# Patient Record
Sex: FEMALE | Race: WHITE | NOT HISPANIC OR LATINO | Employment: UNEMPLOYED | URBAN - METROPOLITAN AREA
[De-identification: names, ages, dates, MRNs, and addresses within clinical notes are randomized per-mention and may not be internally consistent; named-entity substitution may affect disease eponyms.]

---
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
## 2024-05-18 ENCOUNTER — HOSPITAL ENCOUNTER (EMERGENCY)
Facility: HOSPITAL | Age: 19
Discharge: HOME | End: 2024-05-18
Attending: STUDENT IN AN ORGANIZED HEALTH CARE EDUCATION/TRAINING PROGRAM
Payer: MEDICAID

## 2024-05-18 ENCOUNTER — APPOINTMENT (OUTPATIENT)
Dept: ULTRASOUND IMAGING | Facility: HOSPITAL | Age: 19
End: 2024-05-18
Payer: MEDICAID

## 2024-05-18 VITALS
DIASTOLIC BLOOD PRESSURE: 74 MMHG | RESPIRATION RATE: 16 BRPM | TEMPERATURE: 97.3 F | OXYGEN SATURATION: 97 % | SYSTOLIC BLOOD PRESSURE: 101 MMHG | HEIGHT: 63 IN | WEIGHT: 130 LBS | BODY MASS INDEX: 23.04 KG/M2 | HEART RATE: 70 BPM

## 2024-05-18 DIAGNOSIS — N93.8 DYSFUNCTIONAL UTERINE BLEEDING: Primary | ICD-10-CM

## 2024-05-18 LAB
ABO GROUP BLD: NORMAL
ALBUMIN SERPL-MCNC: 4.7 G/DL (ref 3.5–5.2)
ALBUMIN/GLOB SERPL: 1.7 G/DL
ALP SERPL-CCNC: 81 U/L (ref 39–117)
ALT SERPL W P-5'-P-CCNC: 12 U/L (ref 1–33)
ANION GAP SERPL CALCULATED.3IONS-SCNC: 11.3 MMOL/L (ref 5–15)
AST SERPL-CCNC: 14 U/L (ref 1–32)
BACTERIA UR QL AUTO: ABNORMAL /HPF
BASOPHILS # BLD AUTO: 0.1 10*3/MM3 (ref 0–0.2)
BASOPHILS NFR BLD AUTO: 0.8 % (ref 0–1.5)
BILIRUB SERPL-MCNC: 0.2 MG/DL (ref 0–1.2)
BILIRUB UR QL STRIP: NEGATIVE
BUN SERPL-MCNC: 6 MG/DL (ref 6–20)
BUN/CREAT SERPL: 9.2 (ref 7–25)
CALCIUM SPEC-SCNC: 9.3 MG/DL (ref 8.6–10.5)
CHLORIDE SERPL-SCNC: 104 MMOL/L (ref 98–107)
CLARITY UR: ABNORMAL
CO2 SERPL-SCNC: 21.7 MMOL/L (ref 22–29)
COLOR UR: YELLOW
CREAT SERPL-MCNC: 0.65 MG/DL (ref 0.57–1)
DEPRECATED RDW RBC AUTO: 44.3 FL (ref 37–54)
EGFRCR SERPLBLD CKD-EPI 2021: 129.4 ML/MIN/1.73
EOSINOPHIL # BLD AUTO: 0.69 10*3/MM3 (ref 0–0.4)
EOSINOPHIL NFR BLD AUTO: 5.3 % (ref 0.3–6.2)
ERYTHROCYTE [DISTWIDTH] IN BLOOD BY AUTOMATED COUNT: 13.8 % (ref 12.3–15.4)
GLOBULIN UR ELPH-MCNC: 2.8 GM/DL
GLUCOSE SERPL-MCNC: 95 MG/DL (ref 65–99)
GLUCOSE UR STRIP-MCNC: NEGATIVE MG/DL
HCG INTACT+B SERPL-ACNC: <1 MIU/ML
HCT VFR BLD AUTO: 44.3 % (ref 34–46.6)
HGB BLD-MCNC: 14.2 G/DL (ref 12–15.9)
HGB UR QL STRIP.AUTO: ABNORMAL
HYALINE CASTS UR QL AUTO: ABNORMAL /LPF
IMM GRANULOCYTES # BLD AUTO: 0.04 10*3/MM3 (ref 0–0.05)
IMM GRANULOCYTES NFR BLD AUTO: 0.3 % (ref 0–0.5)
KETONES UR QL STRIP: NEGATIVE
LEUKOCYTE ESTERASE UR QL STRIP.AUTO: ABNORMAL
LYMPHOCYTES # BLD AUTO: 2.46 10*3/MM3 (ref 0.7–3.1)
LYMPHOCYTES NFR BLD AUTO: 19.1 % (ref 19.6–45.3)
MCH RBC QN AUTO: 28 PG (ref 26.6–33)
MCHC RBC AUTO-ENTMCNC: 32.1 G/DL (ref 31.5–35.7)
MCV RBC AUTO: 87.2 FL (ref 79–97)
MONOCYTES # BLD AUTO: 0.83 10*3/MM3 (ref 0.1–0.9)
MONOCYTES NFR BLD AUTO: 6.4 % (ref 5–12)
NEUTROPHILS NFR BLD AUTO: 68.1 % (ref 42.7–76)
NEUTROPHILS NFR BLD AUTO: 8.79 10*3/MM3 (ref 1.7–7)
NITRITE UR QL STRIP: NEGATIVE
NRBC BLD AUTO-RTO: 0 /100 WBC (ref 0–0.2)
PH UR STRIP.AUTO: 5.5 [PH] (ref 5–8)
PLATELET # BLD AUTO: 488 10*3/MM3 (ref 140–450)
PMV BLD AUTO: 9.5 FL (ref 6–12)
POTASSIUM SERPL-SCNC: 3.8 MMOL/L (ref 3.5–5.2)
PROT SERPL-MCNC: 7.5 G/DL (ref 6–8.5)
PROT UR QL STRIP: NEGATIVE
RBC # BLD AUTO: 5.08 10*6/MM3 (ref 3.77–5.28)
RBC # UR STRIP: ABNORMAL /HPF
REF LAB TEST METHOD: ABNORMAL
RH BLD: POSITIVE
SODIUM SERPL-SCNC: 137 MMOL/L (ref 136–145)
SP GR UR STRIP: 1.02 (ref 1–1.03)
SQUAMOUS #/AREA URNS HPF: ABNORMAL /HPF
UROBILINOGEN UR QL STRIP: ABNORMAL
WBC # UR STRIP: ABNORMAL /HPF
WBC NRBC COR # BLD AUTO: 12.91 10*3/MM3 (ref 3.4–10.8)

## 2024-05-18 PROCEDURE — 86901 BLOOD TYPING SEROLOGIC RH(D): CPT | Performed by: PHYSICIAN ASSISTANT

## 2024-05-18 PROCEDURE — 85025 COMPLETE CBC W/AUTO DIFF WBC: CPT | Performed by: PHYSICIAN ASSISTANT

## 2024-05-18 PROCEDURE — 93976 VASCULAR STUDY: CPT

## 2024-05-18 PROCEDURE — 81001 URINALYSIS AUTO W/SCOPE: CPT | Performed by: PHYSICIAN ASSISTANT

## 2024-05-18 PROCEDURE — 76830 TRANSVAGINAL US NON-OB: CPT

## 2024-05-18 PROCEDURE — 36415 COLL VENOUS BLD VENIPUNCTURE: CPT

## 2024-05-18 PROCEDURE — 76856 US EXAM PELVIC COMPLETE: CPT

## 2024-05-18 PROCEDURE — 99284 EMERGENCY DEPT VISIT MOD MDM: CPT

## 2024-05-18 PROCEDURE — 86900 BLOOD TYPING SEROLOGIC ABO: CPT | Performed by: PHYSICIAN ASSISTANT

## 2024-05-18 PROCEDURE — 80053 COMPREHEN METABOLIC PANEL: CPT | Performed by: PHYSICIAN ASSISTANT

## 2024-05-18 PROCEDURE — 84702 CHORIONIC GONADOTROPIN TEST: CPT | Performed by: PHYSICIAN ASSISTANT

## 2024-05-19 NOTE — ED PROVIDER NOTES
EMERGENCY DEPARTMENT MD ATTESTATION NOTE    Room Number:  33/33  PCP: Provider, No Known      HPI:    Context: Danika Owusu is a 19 y.o. female with a medical history of ovarian cysts who presents to the ED c/o acute vaginal bleeding.  Patient thinks that she is 7 weeks pregnant after a positive test at home.  Patient has not had ultrasound confirming IUP.        Review of prior external notes (non-ED) -and- Review of prior external test results outside of this encounter: Discharge summary from 10/23/2023 reviewed and notable for admission secondary to active later.  Patient had uncomplicated delivery and eventually discharged home.    Prescription drug monitoring program review:           PHYSICAL EXAM    I have reviewed the triage vital signs and nursing notes.    ED Triage Vitals   Temp Heart Rate Resp BP SpO2   05/18/24 1843 05/18/24 1843 05/18/24 1843 05/18/24 1924 05/18/24 1843   97.3 °F (36.3 °C) 113 18 122/77 98 %      Temp src Heart Rate Source Patient Position BP Location FiO2 (%)   05/18/24 1843 05/18/24 1843 -- -- --   Tympanic Monitor              MEDICATIONS GIVEN IN ER  Medications - No data to display      ORDERS PLACED DURING THIS VISIT:  Orders Placed This Encounter   Procedures    US Testicular or Ovarian Vascular Limited    US Pelvis Transvaginal Non OB    US Pelvis Complete    Comprehensive Metabolic Panel    hCG, Quantitative, Pregnancy    Urinalysis With Microscopic If Indicated (No Culture) - Urine, Catheter    CBC Auto Differential    Urinalysis, Microscopic Only - Urine, Clean Catch    ABO / Rh    CBC & Differential         PROCEDURES  Procedures            PROGRESS, DATA ANALYSIS, CONSULTS, AND MEDICAL DECISION MAKING  All labs have been independently interpreted by me.  All radiology studies have been reviewed by me. All EKG's have been independently viewed and interpreted by me.  Discussion below represents my analysis of pertinent findings related to patient's condition, differential  diagnosis, treatment plan and final disposition.    Differential diagnosis includes but is not limited to threatened , first trimester bleeding, menstrual cycle    Clinical Scores:                   ED Course as of 24 2110   Sat May 18, 2024   1923 FIRST LOOK:  M1  LMP 2024  Positive urine pregnancy test at home.  OB/GYN: Dr. Gerhard Cagle    Woke this morning at ~0600 and noted pink discharge. At ~1700 bleeding increased and started having lower abdominal cramping. She has not had to change her pad yet. Had a few minutes of feeling light-headed which has since improved. [AR]    Hemoglobin: 14.2 [EE]    HCG Quantitative: <1.00 [EE]    Creatinine: 0.65 [EE]    I discussed ultrasound findings with Cynthia technician.  Patient has no visible IUP.  There is a small ovarian cyst.  Good blood flow to both ovaries. [EE]    Updated patient on workup.  We will refer her to GYN.  Patient has stable vitals. [EE]      ED Course User Index  [AR] Morelia Clifford PA  [EE] Jose Sutton PA       MDM: 19-year-old female presenting for evaluation of vaginal bleeding during pregnancy.  Patient has a quantitative hCG less than 1.  Patient has no visible IUP on ultrasound.  Do not believe patient is pregnant.  Patient discharged to follow-up on outpatient basis.      COMPLEXITY OF CARE  Admission was considered but after careful review of the patient's presentation, physical examination, diagnostic results, and response to treatment the patient may be safely discharged with outpatient follow-up.    Please note that portions of this document were completed with a voice recognition program.    Note Disclaimer: At [facility], we believe that sharing information builds trust and better relationships. You are receiving this note because you recently visited [facility]. It is possible you will see health information before a provider has talked with you about it. This kind of  information can be easy to misunderstand. To help you fully understand what it means for your health, we urge you to discuss this note with your provider.         Jim Franco MD  05/18/24 0836

## 2024-05-19 NOTE — ED PROVIDER NOTES
EMERGENCY DEPARTMENT ENCOUNTER    Room Number:  33/33  Date of encounter:  2024  PCP: Provider, No Known  Historian: Patient  Chronic or social conditions impacting care (social determinants of health): None    HPI:  Chief Complaint: Vaginal bleeding  A complete HPI/ROS/PMH/PSH/SH/FH are unobtainable due to: Nothing    Context: Danika Owusu is a 19 y.o. female with a history of ovarian cysts.  She presents to the ED c/o acute vaginal bleeding at 7 weeks pregnancy.  Patient is .  Patient follows with Dr. Cagle in obstetrics.  Patient awoke this morning with some mild pinkish discharge.  At 5:00 this evening bleeding and cramping worsened.  She denies any chest pain.  She did report some mild lightheadedness however this has resolved.    Review of prior external notes (non-ED):   I reviewed admission from 10/21/2023.  Patient had vaginal delivery.    Review of prior external test results outside of this encounter:  Reviewed blood work from 10/21/2023.  Patient with O+ blood.    PAST MEDICAL HISTORY  Active Ambulatory Problems     Diagnosis Date Noted    Fetal cardiac anomaly affecting pregnancy, antepartum 2023    Advanced care planning/counseling discussion 2023    False labor after 37 weeks of gestation without delivery 10/13/2023     (spontaneous vaginal delivery) 10/21/2023     Resolved Ambulatory Problems     Diagnosis Date Noted    Pregnancy 10/21/2023     Past Medical History:   Diagnosis Date    Ovarian cyst          PAST SURGICAL HISTORY  No past surgical history on file.      FAMILY HISTORY  Family History   Problem Relation Age of Onset    Hypertension Maternal Grandmother     Hypertension Maternal Grandfather     Hypertension Paternal Grandmother     Hypertension Paternal Grandfather     Birth defects Neg Hx     Diabetes Neg Hx          SOCIAL HISTORY  Social History     Socioeconomic History    Marital status: Single     Spouse name: Vincenzo Dan    Number of children: 0     Years of education: 12    Highest education level: High school graduate   Tobacco Use    Smoking status: Never     Passive exposure: Current    Smokeless tobacco: Never   Vaping Use    Vaping status: Every Day    Substances: Nicotine, Flavoring    Devices: Disposable    Passive vaping exposure: Yes   Substance and Sexual Activity    Alcohol use: Never    Drug use: Not Currently     Frequency: 4.0 times per week     Types: Marijuana    Sexual activity: Yes     Partners: Male     Birth control/protection: None         ALLERGIES  Shellfish allergy and Fd&c red #40 [red dye]        REVIEW OF SYSTEMS  All systems reviewed and negative except for those discussed in HPI.       PHYSICAL EXAM    I have reviewed the triage vital signs and nursing notes.    ED Triage Vitals   Temp Heart Rate Resp BP SpO2   05/18/24 1843 05/18/24 1843 05/18/24 1843 05/18/24 1924 05/18/24 1843   97.3 °F (36.3 °C) 113 18 122/77 98 %      Temp src Heart Rate Source Patient Position BP Location FiO2 (%)   05/18/24 1843 05/18/24 1843 -- -- --   Tympanic Monitor          Physical Exam  GENERAL: Alert, oriented, nontoxic-appearing, not distressed  HENT: head atraumatic, no nuchal rigidity  EYES: no scleral icterus, EOMI  CV: regular rhythm, regular rate, no murmur  RESPIRATORY: normal effort, CTA  ABDOMEN: soft, nontender  MUSCULOSKELETAL: no deformity, FROM, no calf swelling or tenderness  NEURO: alert, moves all extremities, follows commands  SKIN: warm, dry        LAB RESULTS  Recent Results (from the past 24 hour(s))   Comprehensive Metabolic Panel    Collection Time: 05/18/24  7:36 PM    Specimen: Blood   Result Value Ref Range    Glucose 95 65 - 99 mg/dL    BUN 6 6 - 20 mg/dL    Creatinine 0.65 0.57 - 1.00 mg/dL    Sodium 137 136 - 145 mmol/L    Potassium 3.8 3.5 - 5.2 mmol/L    Chloride 104 98 - 107 mmol/L    CO2 21.7 (L) 22.0 - 29.0 mmol/L    Calcium 9.3 8.6 - 10.5 mg/dL    Total Protein 7.5 6.0 - 8.5 g/dL    Albumin 4.7 3.5 - 5.2 g/dL    ALT  (SGPT) 12 1 - 33 U/L    AST (SGOT) 14 1 - 32 U/L    Alkaline Phosphatase 81 39 - 117 U/L    Total Bilirubin 0.2 0.0 - 1.2 mg/dL    Globulin 2.8 gm/dL    A/G Ratio 1.7 g/dL    BUN/Creatinine Ratio 9.2 7.0 - 25.0    Anion Gap 11.3 5.0 - 15.0 mmol/L    eGFR 129.4 >60.0 mL/min/1.73   ABO / Rh    Collection Time: 05/18/24  7:36 PM    Specimen: Blood   Result Value Ref Range    ABO Type O     RH type Positive    hCG, Quantitative, Pregnancy    Collection Time: 05/18/24  7:36 PM    Specimen: Blood   Result Value Ref Range    HCG Quantitative <1.00 mIU/mL   Urinalysis With Microscopic If Indicated (No Culture) - Urine, Clean Catch    Collection Time: 05/18/24  7:36 PM    Specimen: Urine, Clean Catch   Result Value Ref Range    Color, UA Yellow Yellow, Straw    Appearance, UA Cloudy (A) Clear    pH, UA 5.5 5.0 - 8.0    Specific Gravity, UA 1.018 1.005 - 1.030    Glucose, UA Negative Negative    Ketones, UA Negative Negative    Bilirubin, UA Negative Negative    Blood, UA Large (3+) (A) Negative    Protein, UA Negative Negative    Leuk Esterase, UA Trace (A) Negative    Nitrite, UA Negative Negative    Urobilinogen, UA 0.2 E.U./dL 0.2 - 1.0 E.U./dL   CBC Auto Differential    Collection Time: 05/18/24  7:36 PM    Specimen: Blood   Result Value Ref Range    WBC 12.91 (H) 3.40 - 10.80 10*3/mm3    RBC 5.08 3.77 - 5.28 10*6/mm3    Hemoglobin 14.2 12.0 - 15.9 g/dL    Hematocrit 44.3 34.0 - 46.6 %    MCV 87.2 79.0 - 97.0 fL    MCH 28.0 26.6 - 33.0 pg    MCHC 32.1 31.5 - 35.7 g/dL    RDW 13.8 12.3 - 15.4 %    RDW-SD 44.3 37.0 - 54.0 fl    MPV 9.5 6.0 - 12.0 fL    Platelets 488 (H) 140 - 450 10*3/mm3    Neutrophil % 68.1 42.7 - 76.0 %    Lymphocyte % 19.1 (L) 19.6 - 45.3 %    Monocyte % 6.4 5.0 - 12.0 %    Eosinophil % 5.3 0.3 - 6.2 %    Basophil % 0.8 0.0 - 1.5 %    Immature Grans % 0.3 0.0 - 0.5 %    Neutrophils, Absolute 8.79 (H) 1.70 - 7.00 10*3/mm3    Lymphocytes, Absolute 2.46 0.70 - 3.10 10*3/mm3    Monocytes, Absolute 0.83  0.10 - 0.90 10*3/mm3    Eosinophils, Absolute 0.69 (H) 0.00 - 0.40 10*3/mm3    Basophils, Absolute 0.10 0.00 - 0.20 10*3/mm3    Immature Grans, Absolute 0.04 0.00 - 0.05 10*3/mm3    nRBC 0.0 0.0 - 0.2 /100 WBC   Urinalysis, Microscopic Only - Urine, Clean Catch    Collection Time: 05/18/24  7:36 PM    Specimen: Urine, Clean Catch   Result Value Ref Range    RBC, UA 0-2 None Seen, 0-2 /HPF    WBC, UA 6-10 (A) None Seen, 0-2 /HPF    Bacteria, UA Trace (A) None Seen /HPF    Squamous Epithelial Cells, UA 7-12 (A) None Seen, 0-2 /HPF    Hyaline Casts, UA 0-2 None Seen /LPF    Methodology Automated Microscopy        Ordered the above labs and independently reviewed the results.        RADIOLOGY  US Testicular or Ovarian Vascular Limited, US Pelvis Transvaginal Non OB, US Pelvis Complete    Result Date: 5/18/2024  US PELVIS COMPLETE-, US TESTICULAR OR OVARIAN VASCULAR LIMITED-, US PELVIC AND TRANSVAGINAL NONOBSTETRICAL-  Clinical: 19-year-old female with vaginal bleeding, home pregnancy test positive, quantitative beta-hCG in ER is negative  Transabdominal and transvaginal ultrasound  Grayscale imaging and color Doppler  FINDINGS: The uterus measures 8.1 cm in length, 3.7 cm AP and 4.6 cm transverse.  The right ovary measures 2.7 x 2.7 x 2.0 cm. It is normal. Typical follicles demonstrated along its surface. Arterial inflow and venous outflow demonstrated on color Doppler and spectral analysis.  The left ovary measures 4.5 x 3.9 x 4.8 cm. There is a left ovarian cyst with several fine internal septations, it measures 3.5 x 3.4 x 3.5 cm. Arterial inflow and venous outflow demonstrated on color Doppler and spectral analysis.  No free fluid is demonstrated within the pelvis. No IUP is demonstrated. The endometrial thickness is 8 mm. Remainder is unremarkable.  CONCLUSION: No IUP. Complex left ovarian cyst as described above.  This report was finalized on 5/18/2024 9:15 PM by Dr. Jay Loja M.D on Workstation: [id number]        I ordered the above noted radiological studies. Reviewed by me and discussed with radiologist.  See dictation for official radiology interpretation.      MEDICATIONS GIVEN IN ER    Medications - No data to display      ADDITIONAL ORDERS CONSIDERED BUT NOT ORDERED:  Nothing      PROGRESS, DATA ANALYSIS, CONSULTS, AND MEDICAL DECISION MAKING    All labs have been independently interpreted by myself.  All radiology studies have been independently interpreted by myself and discussed with radiologist dictating the report.   EKG's independently interpreted by myself.  Discussion below represents my analysis of pertinent findings related to patient's condition, differential diagnosis, treatment plan and final disposition.    I have discussed case with Dr. Franco, emergency room physician.  He has performed his own bedside examination and agrees with treatment plan.    ED Course as of 24 0502   Sat May 18, 2024   1923 FIRST LOOK:  M1  LMP 2024  Positive urine pregnancy test at home.  OB/GYN: Dr. Gerhard Cagle    Woke this morning at ~0600 and noted pink discharge. At ~1700 bleeding increased and started having lower abdominal cramping. She has not had to change her pad yet. Had a few minutes of feeling light-headed which has since improved. [AR]    Hemoglobin: 14.2 [EE]    HCG Quantitative: <1.00 [EE]    Creatinine: 0.65 [EE]    I discussed ultrasound findings with Cynthia, technician.  Patient has no visible IUP.  There is a small ovarian cyst.  Good blood flow to both ovaries. [EE]    Updated patient on workup.  We will refer her to GYN.  Patient has stable vitals. [EE]      ED Course User Index  [AR] Morelia Clifford PA  [EE] Jose Sutton PA       AS OF 05:02 EDT VITALS:    BP - 101/74  HR - 70  TEMP - 97.3 °F (36.3 °C) (Tympanic)  O2 SATS - 97%        DIAGNOSIS  Final diagnoses:   Dysfunctional uterine bleeding         DISPOSITION  Discharged    Admission was considered  but after careful review of the patient's presentation, physical examination, diagnostic results, and response to treatment the patient may be safely discharged with outpatient follow-up.         Dictated utilizing Dragon dictation     Jose Sutton PA  05/19/24 050

## 2024-06-05 ENCOUNTER — TELEPHONE (OUTPATIENT)
Dept: OBSTETRICS AND GYNECOLOGY | Facility: CLINIC | Age: 19
End: 2024-06-05
Payer: MEDICAID

## 2024-06-05 NOTE — TELEPHONE ENCOUNTER
Dr. Cagle pt    Believes she is pregnant. Took test around the beginning of may (positive), says she went to ED 5/18/24 and was told she is not pregnant, and HCG was <1.00    Pt says she is pregnant and ED may have been wrong. Should she schedule first available new ob or labs?    Please advise,   Thank you

## 2024-09-17 ENCOUNTER — INITIAL PRENATAL (OUTPATIENT)
Dept: OBSTETRICS AND GYNECOLOGY | Facility: CLINIC | Age: 19
End: 2024-09-17
Payer: MEDICAID

## 2024-09-17 VITALS — WEIGHT: 133.2 LBS | DIASTOLIC BLOOD PRESSURE: 66 MMHG | SYSTOLIC BLOOD PRESSURE: 114 MMHG | BODY MASS INDEX: 23.6 KG/M2

## 2024-09-17 DIAGNOSIS — Z34.91 INITIAL OBSTETRIC VISIT IN FIRST TRIMESTER: ICD-10-CM

## 2024-09-17 DIAGNOSIS — Z3A.08 8 WEEKS GESTATION OF PREGNANCY: Primary | ICD-10-CM

## 2024-09-17 LAB
B-HCG UR QL: POSITIVE
EXPIRATION DATE: ABNORMAL
INTERNAL NEGATIVE CONTROL: NEGATIVE
INTERNAL POSITIVE CONTROL: ABNORMAL
Lab: ABNORMAL

## 2024-09-19 LAB
BACTERIA UR CULT: NORMAL
BACTERIA UR CULT: NORMAL
C TRACH RRNA SPEC QL NAA+PROBE: NEGATIVE
N GONORRHOEA RRNA SPEC QL NAA+PROBE: NEGATIVE

## 2024-09-20 LAB
AMPHETAMINES UR QL SCN: NEGATIVE NG/ML
BARBITURATES UR QL SCN: NEGATIVE NG/ML
BENZODIAZ UR QL: NEGATIVE NG/ML
BZE UR QL: NEGATIVE NG/ML
CARBOXYTHC UR QL CFM: POSITIVE
METHADONE UR QL SCN: NEGATIVE NG/ML
OPIATES UR QL: NEGATIVE NG/ML
PCP UR QL SCN: NEGATIVE NG/ML
PROPOXYPH UR QL SCN: NEGATIVE NG/ML

## 2024-10-15 ENCOUNTER — ROUTINE PRENATAL (OUTPATIENT)
Dept: OBSTETRICS AND GYNECOLOGY | Facility: CLINIC | Age: 19
End: 2024-10-15
Payer: MEDICAID

## 2024-10-15 VITALS — DIASTOLIC BLOOD PRESSURE: 78 MMHG | SYSTOLIC BLOOD PRESSURE: 118 MMHG | BODY MASS INDEX: 23.77 KG/M2 | WEIGHT: 134.2 LBS

## 2024-10-15 DIAGNOSIS — Z34.81 PRENATAL CARE, SUBSEQUENT PREGNANCY IN FIRST TRIMESTER: ICD-10-CM

## 2024-10-15 DIAGNOSIS — Z3A.11 11 WEEKS GESTATION OF PREGNANCY: Primary | ICD-10-CM

## 2024-10-15 LAB
GLUCOSE UR STRIP-MCNC: NEGATIVE MG/DL
PROT UR STRIP-MCNC: NEGATIVE MG/DL

## 2024-10-15 NOTE — PROGRESS NOTES
OB follow up     Danika Owusu is a 19 y.o.  11w5d being seen today for her obstetrical visit.  Patient reports no complaints. Fetal movement:  Too early .    Her prenatal care is complicated by (and status): Prior child with VSD    Review of Systems  Cramping/contractions : Mild cramps with no bleeding  Vaginal bleeding: Negative  Fetal movement too early    /78   Wt 60.9 kg (134 lb 3.2 oz)   LMP 2024 (Exact Date)   BMI 23.77 kg/m²     FHT: 146 BPM   Uterine Size: size equals dates       Assessment    Diagnoses and all orders for this visit:    1. 11 weeks gestation of pregnancy (Primary)  -     POC Urinalysis Dipstick  -     OB Panel With HIV  -     [id number] PLUS Core+SCA - Blood,    2. Prenatal care, subsequent pregnancy in first trimester        1) pregnancy at 11w5d         Plan    Reviewed this stage of pregnancy  Problem list updated   Follow up in 4 weeks.  NIPT and prenatal labs today.  Will make MFM referral on her next visit.    Gerhard Cagle MD   10/15/2024  10:47 EDT

## 2024-10-16 LAB
ABO GROUP BLD: ABNORMAL
BASOPHILS # BLD AUTO: 0.1 X10E3/UL (ref 0–0.2)
BASOPHILS NFR BLD AUTO: 1 %
BLD GP AB SCN SERPL QL: NEGATIVE
EOSINOPHIL # BLD AUTO: 0.6 X10E3/UL (ref 0–0.4)
EOSINOPHIL NFR BLD AUTO: 4 %
ERYTHROCYTE [DISTWIDTH] IN BLOOD BY AUTOMATED COUNT: 12.7 % (ref 11.7–15.4)
HBV SURFACE AG SERPL QL IA: NEGATIVE
HCT VFR BLD AUTO: 42 % (ref 34–46.6)
HCV AB SERPL QL IA: NORMAL
HCV IGG SERPL QL IA: NON REACTIVE
HGB BLD-MCNC: 13.8 G/DL (ref 11.1–15.9)
HIV 1+2 AB+HIV1 P24 AG SERPL QL IA: NON REACTIVE
IMM GRANULOCYTES # BLD AUTO: 0.1 X10E3/UL (ref 0–0.1)
IMM GRANULOCYTES NFR BLD AUTO: 1 %
LYMPHOCYTES # BLD AUTO: 3.6 X10E3/UL (ref 0.7–3.1)
LYMPHOCYTES NFR BLD AUTO: 24 %
MCH RBC QN AUTO: 28.9 PG (ref 26.6–33)
MCHC RBC AUTO-ENTMCNC: 32.9 G/DL (ref 31.5–35.7)
MCV RBC AUTO: 88 FL (ref 79–97)
MONOCYTES # BLD AUTO: 0.9 X10E3/UL (ref 0.1–0.9)
MONOCYTES NFR BLD AUTO: 6 %
NEUTROPHILS # BLD AUTO: 9.6 X10E3/UL (ref 1.4–7)
NEUTROPHILS NFR BLD AUTO: 64 %
PLATELET # BLD AUTO: 488 X10E3/UL (ref 150–450)
RBC # BLD AUTO: 4.77 X10E6/UL (ref 3.77–5.28)
RH BLD: POSITIVE
RPR SER QL: NON REACTIVE
RUBV IGG SERPL IA-ACNC: 1.59 INDEX
WBC # BLD AUTO: 14.9 X10E3/UL (ref 3.4–10.8)

## 2024-10-22 LAB
CFDNA.FET/CFDNA.TOTAL SFR FETUS: NORMAL %
CITATION REF LAB TEST: NORMAL
FET 13+18+21+X+Y ANEUP PLAS.CFDNA: NEGATIVE
FET CHR 21 TS PLAS.CFDNA QL: NEGATIVE
FET MS X RISK WBC.DNA+CFDNA QL: NOT DETECTED
FET SEX PLAS.CFDNA DOSAGE CFDNA: NORMAL
FET TS 13 RISK PLAS.CFDNA QL: NEGATIVE
FET TS 18 RISK WBC.DNA+CFDNA QL: NEGATIVE
FET X + Y ANEUP RISK PLAS.CFDNA SEQ-IMP: NOT DETECTED
GA EST FROM CONCEPTION DATE: NORMAL D
GESTATIONAL AGE > 9:: YES
LAB DIRECTOR NAME PROVIDER: NORMAL
LAB DIRECTOR NAME PROVIDER: NORMAL
LABORATORY COMMENT REPORT: NORMAL
LIMITATIONS OF THE TEST: NORMAL
NEGATIVE PREDICTIVE VALUE: NORMAL
NOTE: NORMAL
PERFORMANCE CHARACTERISTICS: NORMAL
POSITIVE PREDICTIVE VALUE: NORMAL
REF LAB TEST METHOD: NORMAL
TEST PERFORMANCE INFO SPEC: NORMAL

## 2024-11-13 ENCOUNTER — ROUTINE PRENATAL (OUTPATIENT)
Dept: OBSTETRICS AND GYNECOLOGY | Facility: CLINIC | Age: 19
End: 2024-11-13
Payer: MEDICAID

## 2024-11-13 VITALS — SYSTOLIC BLOOD PRESSURE: 148 MMHG | DIASTOLIC BLOOD PRESSURE: 83 MMHG | WEIGHT: 137.4 LBS | BODY MASS INDEX: 24.34 KG/M2

## 2024-11-13 DIAGNOSIS — O35.BXX0 ANOMALY OF HEART OF FETUS AFFECTING PREGNANCY, ANTEPARTUM, SINGLE OR UNSPECIFIED FETUS: ICD-10-CM

## 2024-11-13 DIAGNOSIS — Z3A.15 15 WEEKS GESTATION OF PREGNANCY: Primary | ICD-10-CM

## 2024-11-13 DIAGNOSIS — Z34.82 PRENATAL CARE, SUBSEQUENT PREGNANCY IN SECOND TRIMESTER: ICD-10-CM

## 2024-11-13 LAB
GLUCOSE UR STRIP-MCNC: NEGATIVE MG/DL
PROT UR STRIP-MCNC: ABNORMAL MG/DL

## 2024-11-13 NOTE — PROGRESS NOTES
OB follow up     Danika Owusu is a 19 y.o.  15w6d being seen today for her obstetrical visit.  Patient reports no complaints. Fetal movement: normal.    Her prenatal care is complicated by (and status): Prior child with congenital heart defect    Review of Systems  Cramping/contractions : Negative  Vaginal bleeding: Negative  Fetal movement is normal    /83   Wt 62.3 kg (137 lb 6.4 oz)   LMP 2024 (Exact Date)   BMI 24.34 kg/m²     FHT: 148 BPM   Uterine Size: size equals dates       Assessment    Diagnoses and all orders for this visit:    1. 15 weeks gestation of pregnancy (Primary)  -     POC Urinalysis Dipstick    2. 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 Prenatal care, subsequent pregnancy in second trimester        1) pregnancy at 15w6d         Plan    Reviewed this stage of pregnancy  Problem list updated   Follow up in 4 weeks.  We will make her maternal-fetal medicine referral request for her anatomy ultrasound due to the history of her prior child having had a VSD.  And I will see her back in 4 weeks.    Gerhard Cagle MD   2024  12:07 EST

## 2024-12-10 ENCOUNTER — TRANSCRIBE ORDERS (OUTPATIENT)
Dept: ULTRASOUND IMAGING | Facility: HOSPITAL | Age: 19
End: 2024-12-10

## 2024-12-10 DIAGNOSIS — O09.299 H/O FETAL ANOMALY IN PRIOR PREGNANCY, CURRENTLY PREGNANT: Primary | ICD-10-CM

## 2024-12-11 ENCOUNTER — ROUTINE PRENATAL (OUTPATIENT)
Dept: OBSTETRICS AND GYNECOLOGY | Facility: CLINIC | Age: 19
End: 2024-12-11

## 2024-12-11 VITALS — DIASTOLIC BLOOD PRESSURE: 82 MMHG | SYSTOLIC BLOOD PRESSURE: 135 MMHG | WEIGHT: 143.8 LBS | BODY MASS INDEX: 25.47 KG/M2

## 2024-12-11 DIAGNOSIS — Z3A.19 19 WEEKS GESTATION OF PREGNANCY: Primary | ICD-10-CM

## 2024-12-11 DIAGNOSIS — Z34.82 PRENATAL CARE, SUBSEQUENT PREGNANCY IN SECOND TRIMESTER: ICD-10-CM

## 2024-12-11 DIAGNOSIS — O35.BXX0 ANOMALY OF HEART OF FETUS AFFECTING PREGNANCY, ANTEPARTUM, SINGLE OR UNSPECIFIED FETUS: ICD-10-CM

## 2024-12-11 NOTE — PROGRESS NOTES
OB follow up     Danika Owusu is a 19 y.o.  19w6d being seen today for her obstetrical visit.  Patient reports no complaints. Fetal movement: normal.    Her prenatal care is complicated by (and status): Prior child with VSD    Review of Systems  Cramping/contractions : Negative  Vaginal bleeding: Spotting yesterday only when she wiped after bowel movement  Fetal movement normal    /82   Wt 65.2 kg (143 lb 12.8 oz)   LMP 2024 (Exact Date)   BMI 25.47 kg/m²     FHT: 148 BPM   Uterine Size: size equals dates       Assessment    Diagnoses and all orders for this visit:    1. 19 weeks gestation of pregnancy (Primary)  -     POC Urinalysis Dipstick    2. Prenatal care, subsequent pregnancy in second trimester    3. 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  She has appointment with maternal-fetal medicine tomorrow due to history of child with congenital heart defect.  Cervical length would also be obtained tomorrow.    Gerhard Cagle MD   2024  10:55 EST

## 2024-12-12 ENCOUNTER — TELEPHONE (OUTPATIENT)
Dept: ULTRASOUND IMAGING | Facility: HOSPITAL | Age: 19
End: 2024-12-12

## 2025-01-08 ENCOUNTER — HOSPITAL ENCOUNTER (OUTPATIENT)
Dept: ULTRASOUND IMAGING | Facility: HOSPITAL | Age: 20
Discharge: HOME | End: 2025-01-08
Admitting: OBSTETRICS & GYNECOLOGY

## 2025-01-08 ENCOUNTER — OFFICE VISIT (OUTPATIENT)
Dept: OBSTETRICS AND GYNECOLOGY | Facility: CLINIC | Age: 20
End: 2025-01-08

## 2025-01-08 VITALS — TEMPERATURE: 97.8 F | SYSTOLIC BLOOD PRESSURE: 120 MMHG | HEART RATE: 95 BPM | DIASTOLIC BLOOD PRESSURE: 73 MMHG

## 2025-01-08 DIAGNOSIS — O09.299 H/O FETAL ANOMALY IN PRIOR PREGNANCY, CURRENTLY PREGNANT: ICD-10-CM

## 2025-01-08 DIAGNOSIS — Z82.79 FAMILY HISTORY OF CONGENITAL HEART DEFECT: Primary | ICD-10-CM

## 2025-01-08 DIAGNOSIS — Z84.82 FAMILY HISTORY OF SIDS (SUDDEN INFANT DEATH SYNDROME): ICD-10-CM

## 2025-01-08 PROCEDURE — 76811 OB US DETAILED SNGL FETUS: CPT

## 2025-01-08 NOTE — PROGRESS NOTES
Pt reports that she is doing well and denies vaginal bleeding, cramping, contractions or LOF at this time. Reports active fetal movement. Reports frequent nose bleeds that occur at least every other day for the last two weeks. Reviewed when to call OB office or present to L&D for evaluation with symptoms such as decreased fetal movement, vaginal bleeding, LOF or ctxs. Pt verbalized understanding. Denies HA, visual changes or epigastric pain. Denies any additional complaints at time of appointment. Next OB appointment scheduled for 1/9.    Vitals:    01/08/25 0918   BP: 120/73   Pulse: 95   Temp: 97.8 °F (36.6 °C)

## 2025-01-08 NOTE — Clinical Note
January 8, 2025     Gerhard Cagle MD  [address] [facility] [address]  [place] [id number]    Patient: Danika Owusu   YOB: 2005   Date of Visit: 1/8/2025       Dear Gerhard Cagle MD    Danika Owusu was in my office today. Below is a copy of my note.    If you have questions, please do not hesitate to call me. I look forward to following Danika along with you.         Sincerely,        Annie Murguia MD        CC: No Recipients    No notes on file

## 2025-01-08 NOTE — LETTER
2025     Gerhard Cagle MD  [address]  [facility] [address]  [place] [id number]    Patient: Danika Owusu   YOB: 2005   Date of Visit: 2025       Dear Gerhard Cagle MD,    Thank you for referring Danika Owusu to me for evaluation. Below is a copy of my consult note.    If you have questions, please do not hesitate to call me. I look forward to following Danika along with you.         Sincerely,        Annie Murguia MD    MATERNAL FETAL MEDICINE Consult Note    Dear Dr Gerhard Cagle MD:    Thank you for your kind referral of Danika Owusu.  As you know, she is a 19 y.o.   23w6d gestation (Estimated Date of Delivery: 25). This is a consult.      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 No vaginal bleeding, no contractions.     Review of History:  Past Medical History:   Diagnosis Date   • Ovarian cyst      History reviewed. No pertinent surgical history.      Social History     Socioeconomic History   • Marital status: Single     Spouse name: Vincenzo Dan   • Number of children: 0   • Years of education: 12   • Highest education level: High school graduate   Tobacco Use   • Smoking status: Never     Passive exposure: Never   • Smokeless tobacco: Never   Vaping Use   • Vaping status: Every Day   • Substances: Nicotine, Flavoring   • Devices: Disposable   • Passive vaping exposure: Yes   Substance and Sexual Activity   • Alcohol use: Never   • Drug use: Not Currently     Frequency: 4.0 times per week     Types: Marijuana   • Sexual activity: Yes     Partners: Male     Birth control/protection: None     Family History   Problem Relation Age of Onset   • Asthma Father    • Asthma Brother    • Hypertension Maternal Grandmother    • Hypertension Maternal Grandfather    •  Hypertension Paternal Grandmother    • Hypertension Paternal Grandfather    • Birth defects Neg Hx    • Diabetes Neg Hx       Allergies   Allergen Reactions   • Shellfish Allergy Hives     Hives around throat area     • Fd&C Red #40 [Red Dye #40 (Allura Red)] Diarrhea     other      Current Outpatient Medications on File Prior to Visit   Medication Sig Dispense Refill   • prenatal vitamin (prenatal, CLASSIC, vitamin) tablet Take 1 tablet by mouth Daily.     • ferrous sulfate 325 (65 FE) MG tablet Take 1 tablet by mouth Daily With Breakfast. (Patient not taking: Reported on 1/8/2025) 60 tablet 1   • ibuprofen (ADVIL,MOTRIN) 600 MG tablet Take 1 tablet by mouth Every 6 (Six) Hours As Needed for Mild Pain (Patient not taking: Reported on 1/8/2025) 40 tablet 1     No current facility-administered medications on file prior to visit.        Past obstetric, gynecological, medical, surgical, family and social history reviewed.  Relevant lab work and imaging reviewed.    Review of systems  Constitutional:  denies fever, chills, malaise.   ENT/Mouth:  denies sore throat, tinnitus  Eyes: denies vision changes/pain  CV:  denies chest pain  Respiratory:  denies cough/SOB  GI:  denies N/V, diarrhea, abdominal pain.    :   denies dysuria  Skin:  denies lesions or pruritus   Neuro:  denies weakness, focal neurologic symptoms    Vitals:    01/08/25 0918   BP: 120/73   BP Location: Right arm   Patient Position: Sitting   Pulse: 95   Temp: 97.8 °F (36.6 °C)   TempSrc: Temporal       PHYSICAL EXAM   GENERAL: Not in acute distress, AAOx3, pleasant  CARDIO: regular rate and rhythm  PULM: symmetric chest rise, speaking in complete sentences without difficulty  NEURO: awake, alert and oriented to person, place, and time  ABDOMINAL: No fundal tenderness, no rebound or guarding, gravid  EXTREMITIES: no bilateral lower extremity edema/tenderness  SKIN: Warm, well-perfused    ULTRASOUND   Please view full ultrasound note on Imaging tab in  ViewPoint.  Cephalic presentation.  Posterior placenta,.  MATTHEW 13 cm, which is normal.    g (48%, AC 49%)  Anatomy appears normal including heart views.   Transabdominal CL >3.5 cm, which is normal.     ASSESSMENT/COUNSELIN y.o.   23w6d gestation (Estimated Date of Delivery: 25).     -Pregnancy  [ X ] stable  [   ] improving [  ] worsening    Diagnoses and all orders for this visit:    1. Family history of congenital heart defect (Primary)    2. Family history of SIDS (sudden infant death syndrome)         Family history of prior child with VSD:  Prior baby unfortunately had SIDS at 2 months of life and passed.  She would like serial growths at [facility] for peace of mind.  I explained that VSDs are the most common single congenital cardiac malformation.  About 3 per cent of CHD is due to classical Mendelian gene effects, with correspondingly high recurrence risks in first-degree relatives, but most cardiac disease inheritance is lower than what would be predicted by Mendelian genetics.  The recurrence risks for offspring are about 10% in most instances of VSD.    Thus, a fetal ECHO is indicated.  I discussed with the patient the limitations of fetal ECHO for small VSDs. However, these often close on their own and do not cause issues.     She has a lot of questions about SIDs recurrence.  We discussed safe sleep and routine things that she was already doing with her previous child as a prevention.  She reported no cause was found.  I encouraged her to discuss with her pediatrician, discussed this was very unlikely to happen again, and said we would reach out to our NICU team to see if they had any literature/guidance.  She was grateful for this.     Summary of Plan  -Fetal ECHO scheduled  -Routine prenatal care  -Pt desires serial growths at [facility] for significant anxiety related to her previous  loss  -Delivery 39 weeks unless indicated sooner    Follow-up: 1 month growth    Thank you for the  consult and opportunity to care for this patient.  Please feel free to reach out with any questions or concerns.      I spent 25 minutes caring for this patient on this date of service. This time includes time spent by me in the following activities: preparing for the visit, reviewing tests, obtaining and/or reviewing a separately obtained history, performing a medically appropriate examination and/or evaluation, counseling and educating the patient/family/caregiver and independently interpreting results and communicating that information with the patient/family/caregiver with greater than 50% spent in counseling and coordination of care.     I spent 5 minutes on the separately reported service of US imaging not included in the time used to support the E/M service also reported today.      Annie Murguia MD [facility]  Maternal Fetal Medicine-[facility]  Office: [phone]  [email or web address]

## 2025-01-08 NOTE — PROGRESS NOTES
MATERNAL FETAL MEDICINE Consult Note    Dear Dr Gerhard Cagle MD:    Thank you for your kind referral of Danika Owusu.  As you know, she is a 19 y.o.   23w6d gestation (Estimated Date of Delivery: 25). This is a consult.      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 No vaginal bleeding, no contractions.     Review of History:  Past Medical History:   Diagnosis Date    Ovarian cyst      History reviewed. No pertinent surgical history.      Social History     Socioeconomic History    Marital status: Single     Spouse name: Vincenzo Dan    Number of children: 0    Years of education: 12    Highest education level: High school graduate   Tobacco Use    Smoking status: Never     Passive exposure: Never    Smokeless tobacco: Never   Vaping Use    Vaping status: Every Day    Substances: Nicotine, Flavoring    Devices: Disposable    Passive vaping exposure: Yes   Substance and Sexual Activity    Alcohol use: Never    Drug use: Not Currently     Frequency: 4.0 times per week     Types: Marijuana    Sexual activity: Yes     Partners: Male     Birth control/protection: None     Family History   Problem Relation Age of Onset    Asthma Father     Asthma Brother     Hypertension Maternal Grandmother     Hypertension Maternal Grandfather     Hypertension Paternal Grandmother     Hypertension Paternal Grandfather     Birth defects Neg Hx     Diabetes Neg Hx       Allergies   Allergen Reactions    Shellfish Allergy Hives     Hives around throat area      Fd&C Red #40 [Red Dye #40 (Allura Red)] Diarrhea     other      Current Outpatient Medications on File Prior to Visit   Medication Sig Dispense Refill    prenatal vitamin (prenatal, CLASSIC, vitamin) tablet Take 1 tablet by mouth Daily.      ferrous sulfate 325 (65  FE) MG tablet Take 1 tablet by mouth Daily With Breakfast. (Patient not taking: Reported on 2025) 60 tablet 1    ibuprofen (ADVIL,MOTRIN) 600 MG tablet Take 1 tablet by mouth Every 6 (Six) Hours As Needed for Mild Pain (Patient not taking: Reported on 2025) 40 tablet 1     No current facility-administered medications on file prior to visit.        Past obstetric, gynecological, medical, surgical, family and social history reviewed.  Relevant lab work and imaging reviewed.    Review of systems  Constitutional:  denies fever, chills, malaise.   ENT/Mouth:  denies sore throat, tinnitus  Eyes: denies vision changes/pain  CV:  denies chest pain  Respiratory:  denies cough/SOB  GI:  denies N/V, diarrhea, abdominal pain.    :   denies dysuria  Skin:  denies lesions or pruritus   Neuro:  denies weakness, focal neurologic symptoms    Vitals:    25 0918   BP: 120/73   BP Location: Right arm   Patient Position: Sitting   Pulse: 95   Temp: 97.8 °F (36.6 °C)   TempSrc: Temporal       PHYSICAL EXAM   GENERAL: Not in acute distress, AAOx3, pleasant  CARDIO: regular rate and rhythm  PULM: symmetric chest rise, speaking in complete sentences without difficulty  NEURO: awake, alert and oriented to person, place, and time  ABDOMINAL: No fundal tenderness, no rebound or guarding, gravid  EXTREMITIES: no bilateral lower extremity edema/tenderness  SKIN: Warm, well-perfused    ULTRASOUND   Please view full ultrasound note on Imaging tab in ViewPoint.  Cephalic presentation.  Posterior placenta,.  MATTHEW 13 cm, which is normal.    g (48%, AC 49%)  Anatomy appears normal including heart views.   Transabdominal CL >3.5 cm, which is normal.     ASSESSMENT/COUNSELIN y.o.   23w6d gestation (Estimated Date of Delivery: 25).     -Pregnancy  [ X ] stable  [   ] improving [  ] worsening    Diagnoses and all orders for this visit:    1. Family history of congenital heart defect (Primary)    2. Family history of  SIDS (sudden infant death syndrome)         Family history of prior child with VSD:  Prior baby unfortunately had SIDS at 2 months of life and passed.  She would like serial growths at [facility] for peace of mind.  I explained that VSDs are the most common single congenital cardiac malformation.  About 3 per cent of CHD is due to classical Mendelian gene effects, with correspondingly high recurrence risks in first-degree relatives, but most cardiac disease inheritance is lower than what would be predicted by Mendelian genetics.  The recurrence risks for offspring are about 10% in most instances of VSD.    Thus, a fetal ECHO is indicated.  I discussed with the patient the limitations of fetal ECHO for small VSDs. However, these often close on their own and do not cause issues.     She has a lot of questions about SIDs recurrence.  We discussed safe sleep and routine things that she was already doing with her previous child as a prevention.  She reported no cause was found.  I encouraged her to discuss with her pediatrician, discussed this was very unlikely to happen again, and said we would reach out to our NICU team to see if they had any literature/guidance.  She was grateful for this.     Summary of Plan  -Fetal ECHO scheduled  -Routine prenatal care  -Pt desires serial growths at [facility] for significant anxiety related to her previous  loss  -Delivery 39 weeks unless indicated sooner    Follow-up: 1 month growth    Thank you for the consult and opportunity to care for this patient.  Please feel free to reach out with any questions or concerns.      I spent 25 minutes caring for this patient on this date of service. This time includes time spent by me in the following activities: preparing for the visit, reviewing tests, obtaining and/or reviewing a separately obtained history, performing a medically appropriate examination and/or evaluation, counseling and educating the patient/family/caregiver and independently  interpreting results and communicating that information with the patient/family/caregiver with greater than 50% spent in counseling and coordination of care.     I spent 5 minutes on the separately reported service of US imaging not included in the time used to support the E/M service also reported today.      Annie Murguia MD FACOG  Maternal Fetal Medicine-[facility]  Office: [phone]  [email or web address]

## 2025-01-09 ENCOUNTER — ROUTINE PRENATAL (OUTPATIENT)
Dept: OBSTETRICS AND GYNECOLOGY | Facility: CLINIC | Age: 20
End: 2025-01-09
Payer: MEDICAID

## 2025-01-09 ENCOUNTER — PATIENT ROUNDING (BHMG ONLY) (OUTPATIENT)
Dept: OBSTETRICS AND GYNECOLOGY | Facility: CLINIC | Age: 20
End: 2025-01-09

## 2025-01-09 VITALS — BODY MASS INDEX: 25.72 KG/M2 | WEIGHT: 145.2 LBS | DIASTOLIC BLOOD PRESSURE: 76 MMHG | SYSTOLIC BLOOD PRESSURE: 130 MMHG

## 2025-01-09 DIAGNOSIS — Z3A.24 24 WEEKS GESTATION OF PREGNANCY: Primary | ICD-10-CM

## 2025-01-09 DIAGNOSIS — Z82.79 FAMILY HISTORY OF CONGENITAL HEART DEFECT: ICD-10-CM

## 2025-01-09 DIAGNOSIS — Z34.82 PRENATAL CARE, SUBSEQUENT PREGNANCY IN SECOND TRIMESTER: ICD-10-CM

## 2025-01-09 LAB
GLUCOSE UR STRIP-MCNC: NEGATIVE MG/DL
PROT UR STRIP-MCNC: ABNORMAL MG/DL

## 2025-01-09 NOTE — PROGRESS NOTES
OB follow up     Danika Owusu is a 19 y.o.  24w0d being seen today for her obstetrical visit.  Patient reports no complaints. Fetal movement: normal.  She had normal ultrasound with maternal-fetal medicine yesterday.  She will continue to get growth ultrasounds at their office.  She will be scheduled to get fetal echocardiogram through their office.    Her prenatal care is complicated by (and status): Prior child with congenital heart defect    Review of Systems  Cramping/contractions : Negative  Vaginal bleeding: Negative  Fetal movement is normal    /76   Wt 65.9 kg (145 lb 3.2 oz)   LMP 2024 (Exact Date)   BMI 25.72 kg/m²     FHT: 142 BPM   Uterine Size: size equals dates       Assessment    Diagnoses and all orders for this visit:    1. 24 weeks gestation of pregnancy (Primary)  -     POC Urinalysis Dipstick  -     Gestational Screen 1 Hr ([facility]); Future  -     Hemoglobin & Hematocrit, Blood; Future  -     Treponema pallidum AB w/Reflex RPR; Future    2. Prenatal care, subsequent pregnancy in second trimester    3. Family history of congenital heart defect        1) pregnancy at 24w0d         Plan    Reviewed this stage of pregnancy  Problem list updated   Follow up in 4 weeks.  Will get 1 hour glucose and treponemal antibody on next visit.    Gerhard Cagle MD   2025  10:56 JOSHUA felix

## 2025-01-15 ENCOUNTER — TRANSCRIBE ORDERS (OUTPATIENT)
Dept: ULTRASOUND IMAGING | Facility: HOSPITAL | Age: 20
End: 2025-01-15
Payer: MEDICAID

## 2025-01-15 DIAGNOSIS — O09.299 H/O FETAL ANOMALY IN PRIOR PREGNANCY, CURRENTLY PREGNANT: Primary | ICD-10-CM

## 2025-01-20 ENCOUNTER — TRANSCRIBE ORDERS (OUTPATIENT)
Dept: ULTRASOUND IMAGING | Facility: HOSPITAL | Age: 20
End: 2025-01-20

## 2025-01-20 DIAGNOSIS — O09.299 H/O FETAL ANOMALY IN PRIOR PREGNANCY, CURRENTLY PREGNANT: Primary | ICD-10-CM

## 2025-02-03 NOTE — PROGRESS NOTES
OB VISIT 27 WEEKS      Chief Complaint   Patient presents with    Routine Prenatal Visit         Danika is a 19 y.o.  27w5d being seen today for her obstetrical visit.  Patient reports fatigue, heartburn, and constipation. Fetal movement: normal. The patient currently sees Dr. Cagle.       REVIEW OF SYSTEMS  Cramping/contractions: denies  Vaginal bleeding: denies  Fetal movement: present    Review of Systems   Eyes:  Negative for blurred vision, double vision and visual disturbance.   Gastrointestinal:  Negative for abdominal pain.   Neurological:  Negative for light-headedness and headache.   All other systems reviewed and are negative.      /84   Wt 70.3 kg (155 lb)   LMP 2024 (Exact Date)   BMI 27.46 kg/m²        Edema  LLE Edema: None  RLE Edema: None  Facial Edema: None    Physical Exam  Constitutional:       General: She is awake.      Appearance: Normal appearance. She is well-developed and well-groomed.   HENT:      Head: Normocephalic and atraumatic.   Pulmonary:      Effort: Pulmonary effort is normal.   Musculoskeletal:      Cervical back: Normal range of motion.   Neurological:      General: No focal deficit present.      Mental Status: She is alert and oriented to person, place, and time.   Skin:     General: Skin is warm and dry.   Psychiatric:         Mood and Affect: Mood normal.         Behavior: Behavior normal. Behavior is cooperative.   Vitals reviewed.         ASSESSMENT/PLAN    Diagnoses and all orders for this visit:    1. Second trimester pregnancy (Primary)  -     POC Urinalysis Dipstick  -     Gestational Screen 1 Hr ([facility])  -     Hemoglobin & Hematocrit, Blood  -     Treponema pallidum AB w/Reflex RPR  -     famotidine (Pepcid) 20 MG tablet; Take 1 tablet by mouth Daily.  Dispense: 30 tablet; Refill: 1  -     docusate sodium (Colace) 100 MG capsule; Take 1 capsule by mouth 2 (Two) Times a Day.  Dispense: 60 capsule; Refill: 1    2. 27 weeks gestation of  pregnancy  -     POC Urinalysis Dipstick  -     Gestational Screen 1 Hr ([facility])  -     Hemoglobin & Hematocrit, Blood  -     Treponema pallidum AB w/Reflex RPR    3. Other constipation  -     docusate sodium (Colace) 100 MG capsule; Take 1 capsule by mouth 2 (Two) Times a Day.  Dispense: 60 capsule; Refill: 1    4. Heart burn  -     famotidine (Pepcid) 20 MG tablet; Take 1 tablet by mouth Daily.  Dispense: 30 tablet; Refill: 1       Urine dip today:  trace protein, negative glucose.   Labs and 1 hour GTT today.   Reviewed this stage of pregnancy.  Problem list updated.     Follow up in 2 weeks with Dr. Cagle.     I spent 30 minutes caring for Danika on this date of service. This time includes time spent by me in the following activities: preparing for the visit, reviewing tests, performing a medically appropriate examination and/or evaluation, counseling and educating the patient/family/caregiver, referring and communicating with other health care professionals, documenting information in the medical record, independently interpreting results and communicating that information with the patient/family/caregiver, care coordination, ordering medications, ordering test(s), ordering procedure(s), obtaining a separately obtained history, and reviewing a separately obtained history.     Tati Layne CNM  2/4/2025  10:28 EST         *Plan of care developed by STEFANIE Lopez-OB and verified by FILIBERTO Layne CNM.*

## 2025-02-04 ENCOUNTER — ROUTINE PRENATAL (OUTPATIENT)
Dept: OBSTETRICS AND GYNECOLOGY | Facility: CLINIC | Age: 20
End: 2025-02-04

## 2025-02-04 VITALS — WEIGHT: 155 LBS | SYSTOLIC BLOOD PRESSURE: 125 MMHG | BODY MASS INDEX: 27.46 KG/M2 | DIASTOLIC BLOOD PRESSURE: 84 MMHG

## 2025-02-04 DIAGNOSIS — Z34.92 SECOND TRIMESTER PREGNANCY: Primary | ICD-10-CM

## 2025-02-04 DIAGNOSIS — K59.09 OTHER CONSTIPATION: ICD-10-CM

## 2025-02-04 DIAGNOSIS — R12 HEART BURN: ICD-10-CM

## 2025-02-04 DIAGNOSIS — Z3A.27 27 WEEKS GESTATION OF PREGNANCY: ICD-10-CM

## 2025-02-04 LAB
GLUCOSE UR STRIP-MCNC: NEGATIVE MG/DL
LEUKOCYTE EST, POC: ABNORMAL
PROT UR STRIP-MCNC: ABNORMAL MG/DL

## 2025-02-04 RX ORDER — FAMOTIDINE 20 MG/1
20 TABLET, FILM COATED ORAL DAILY
Qty: 30 TABLET | Refills: 1 | Status: SHIPPED | OUTPATIENT
Start: 2025-02-04 | End: 2026-02-04

## 2025-02-04 RX ORDER — DOCUSATE SODIUM 100 MG/1
100 CAPSULE, LIQUID FILLED ORAL 2 TIMES DAILY
Qty: 60 CAPSULE | Refills: 1 | Status: SHIPPED | OUTPATIENT
Start: 2025-02-04

## 2025-02-05 ENCOUNTER — HOSPITAL ENCOUNTER (OUTPATIENT)
Dept: ULTRASOUND IMAGING | Facility: HOSPITAL | Age: 20
Discharge: HOME | End: 2025-02-05
Admitting: OBSTETRICS & GYNECOLOGY

## 2025-02-05 ENCOUNTER — OFFICE VISIT (OUTPATIENT)
Dept: OBSTETRICS AND GYNECOLOGY | Facility: CLINIC | Age: 20
End: 2025-02-05

## 2025-02-05 VITALS
BODY MASS INDEX: 27.64 KG/M2 | WEIGHT: 156 LBS | SYSTOLIC BLOOD PRESSURE: 117 MMHG | TEMPERATURE: 97.3 F | HEART RATE: 93 BPM | DIASTOLIC BLOOD PRESSURE: 69 MMHG | HEIGHT: 63 IN

## 2025-02-05 DIAGNOSIS — O09.299 H/O FETAL ANOMALY IN PRIOR PREGNANCY, CURRENTLY PREGNANT: ICD-10-CM

## 2025-02-05 DIAGNOSIS — Z84.82 FAMILY HISTORY OF SIDS (SUDDEN INFANT DEATH SYNDROME): ICD-10-CM

## 2025-02-05 DIAGNOSIS — Z87.59 HISTORY OF NEONATAL DEATH: ICD-10-CM

## 2025-02-05 DIAGNOSIS — Z82.79 FAMILY HISTORY OF CONGENITAL HEART DEFECT: ICD-10-CM

## 2025-02-05 DIAGNOSIS — Z3A.27 27 WEEKS GESTATION OF PREGNANCY: Primary | ICD-10-CM

## 2025-02-05 LAB
GLUCOSE 1H P 50 G GLC PO SERPL-MCNC: 107 MG/DL (ref 70–139)
HCT VFR BLD AUTO: 36.6 % (ref 34–46.6)
HGB BLD-MCNC: 12 G/DL (ref 11.1–15.9)

## 2025-02-05 PROCEDURE — 76819 FETAL BIOPHYS PROFIL W/O NST: CPT

## 2025-02-05 PROCEDURE — 76816 OB US FOLLOW-UP PER FETUS: CPT

## 2025-02-05 NOTE — LETTER
2025     Gerhard Cagle MD  [address]  [facility] [address]  [place] [id number]    Patient: Danika Owusu   YOB: 2005   Date of Visit: 2025       Dear Gerhard Cagle MD    Danika Owusu was in my office today. Below is a copy of my note.    If you have questions, please do not hesitate to call me. I look forward to following Danika along with you.         Sincerely,        Jailene Reese MD        CC: No Recipients    Pt reports that she is doing well and denies vaginal bleeding, cramping, contractions or LOF at this time. Reports active fetal movement. Reviewed when to call OB office or present to L&D for evaluation with symptoms such as decreased fetal movement, vaginal bleeding, LOF or ctxs. Pt verbalized understanding. Denies HA, visual changes or epigastric pain. Denies any additional complaints at time of appointment. Next OB appointment scheduled for .          Vitals:    25 1150   BP: 117/69   Pulse: 93   Temp: 97.3 °F (36.3 °C)         MATERNAL FETAL MEDICINE Consult Note    Dear Dr Campo ref. provider found:    Thank you for your kind referral of Danika Owusu.  As you know, she is a 19 y.o.   27w6d gestation (Estimated Date of Delivery: 25). This is a consult.      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 Normal fetal movement.   She denies headache, vision changes, shortness of breath, acute changes in edema, and RUQ pain.       Review of History:  Past Medical History:   Diagnosis Date   • Ovarian cyst      History reviewed. No pertinent surgical history.      Social History     Socioeconomic History   • Marital status: Single     Spouse name: Vincenzo Dan   • Number of children: 0   • Years of education: 12   • Highest education level: High school graduate   Tobacco  Use   • Smoking status: Never     Passive exposure: Never   • Smokeless tobacco: Never   Vaping Use   • Vaping status: Every Day   • Substances: Nicotine, Flavoring   • Devices: Disposable   • Passive vaping exposure: Yes   Substance and Sexual Activity   • Alcohol use: Never   • Drug use: Not Currently     Frequency: 4.0 times per week     Types: Marijuana   • Sexual activity: Yes     Partners: Male     Birth control/protection: None     Family History   Problem Relation Age of Onset   • Asthma Father    • Asthma Brother    • Hypertension Maternal Grandmother    • Hypertension Maternal Grandfather    • Hypertension Paternal Grandmother    • Hypertension Paternal Grandfather    • Birth defects Neg Hx    • Diabetes Neg Hx       Allergies   Allergen Reactions   • Shellfish Allergy Hives     Hives around throat area     • Fd&C Red #40 [Red Dye #40 (Allura Red)] Diarrhea     other      Current Outpatient Medications on File Prior to Visit   Medication Sig Dispense Refill   • docusate sodium (Colace) 100 MG capsule Take 1 capsule by mouth 2 (Two) Times a Day. 60 capsule 1   • famotidine (Pepcid) 20 MG tablet Take 1 tablet by mouth Daily. 30 tablet 1   • prenatal vitamin (prenatal, CLASSIC, vitamin) tablet Take 1 tablet by mouth Daily.     • ferrous sulfate 325 (65 FE) MG tablet Take 1 tablet by mouth Daily With Breakfast. (Patient not taking: Reported on 2/5/2025) 60 tablet 1   • ibuprofen (ADVIL,MOTRIN) 600 MG tablet Take 1 tablet by mouth Every 6 (Six) Hours As Needed for Mild Pain (Patient not taking: Reported on 2/5/2025) 40 tablet 1     No current facility-administered medications on file prior to visit.        Past obstetric, gynecological, medical, surgical, family and social history reviewed.  Relevant lab work and imaging reviewed.    Review of systems  As above in HPI     Vitals:    02/05/25 1150   BP: 117/69   BP Location: Right arm   Patient Position: Sitting   Pulse: 93   Temp: 97.3 °F (36.3 °C)   TempSrc:  "Temporal   Weight: 70.8 kg (156 lb)   Height: 160 cm (62.99\")       PHYSICAL EXAM   General: No acute distress  Respiratory: No increased work of breathing  Cardiac: reg rate  Abdominal: Gravid, nontender  Extremities: No significant edema  Neuro/psych: Alert and oriented, appropriate mood      ULTRASOUND   Please view full ultrasound note on Imaging tab in ViewPoint.    Cephalic presentation  Posterior placenta  MATTHEW 14.8 cm which is normal  BPP 8  Fetal biometry is consistent with dates EFW 56%, AC 44%      ASSESSMENT/COUNSELIN y.o.  27w6d gestation (Estimated Date of Delivery: 25).     -Pregnancy  [ X ] stable  [   ] improving [  ] worsening    Diagnoses and all orders for this visit:    1. 27 weeks gestation of pregnancy (Primary)    2. Family history of SIDS (sudden infant death syndrome)    3. History of  death    4. Family history of congenital heart defect        Family history of prior child with VSD:  Prior baby unfortunately had SIDS at 2 months of life and passed.  She would like serial growths at [facility] for peace of mind.  I explained that VSDs are the most common single congenital cardiac malformation.  About 3 per cent of CHD is due to classical Mendelian gene effects, with correspondingly high recurrence risks in first-degree relatives, but most cardiac disease inheritance is lower than what would be predicted by Mendelian genetics.  The recurrence risks for offspring are about 10% in most instances of VSD.    Thus, a fetal ECHO is indicated.  I discussed with the patient the limitations of fetal ECHO for small VSDs. However, these often close on their own and do not cause issues. She has been scheduled and missed her echo. Will reschedule.     We did discuss option of meeting with pediatricians prior to delivery to discuss SIDS reduction.  Also discussed the option of having a NICU consult to discuss similar.  She expressed understanding and would be interested.      Summary " of Plan  -Fetal ECHO scheduled-- Will reschedule.  -Routine prenatal care  -Pt desires serial growths at [facility] for significant anxiety related to her previous  loss  -Delivery 39 weeks unless indicated sooner    Follow-up: 1 month growth    Thank you for the consult and opportunity to care for this patient.  Please feel free to reach out with any questions or concerns.      I spent 10 minutes caring for this patient on this date of service. This time includes time spent by me in the following activities: preparing for the visit, reviewing tests, obtaining and/or reviewing a separately obtained history, performing a medically appropriate examination and/or evaluation, counseling and educating the patient/family/caregiver and independently interpreting results and communicating that information with the patient/family/caregiver with greater than 50% spent in counseling and coordination of care.     I spent 3 minutes on the separately reported service of US imaging not included in the time used to support the E/M service also reported today.      Jailene Reese MD   Maternal Fetal Medicine-[facility]  Office: [phone]

## 2025-02-05 NOTE — PROGRESS NOTES
Pt reports that she is doing well and denies vaginal bleeding, cramping, contractions or LOF at this time. Reports active fetal movement. Reviewed when to call OB office or present to L&D for evaluation with symptoms such as decreased fetal movement, vaginal bleeding, LOF or ctxs. Pt verbalized understanding. Denies HA, visual changes or epigastric pain. Denies any additional complaints at time of appointment. Next OB appointment scheduled for 2/20.          Vitals:    02/05/25 1150   BP: 117/69   Pulse: 93   Temp: 97.3 °F (36.3 °C)

## 2025-02-05 NOTE — PROGRESS NOTES
MATERNAL FETAL MEDICINE Consult Note    Dear Dr Campo ref. provider found:    Thank you for your kind referral of Danika Owusu.  As you know, she is a 19 y.o.   27w6d gestation (Estimated Date of Delivery: 25). This is a consult.      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 Normal fetal movement.   She denies headache, vision changes, shortness of breath, acute changes in edema, and RUQ pain.       Review of History:  Past Medical History:   Diagnosis Date    Ovarian cyst      History reviewed. No pertinent surgical history.      Social History     Socioeconomic History    Marital status: Single     Spouse name: Vincenzo Dan    Number of children: 0    Years of education: 12    Highest education level: High school graduate   Tobacco Use    Smoking status: Never     Passive exposure: Never    Smokeless tobacco: Never   Vaping Use    Vaping status: Every Day    Substances: Nicotine, Flavoring    Devices: Disposable    Passive vaping exposure: Yes   Substance and Sexual Activity    Alcohol use: Never    Drug use: Not Currently     Frequency: 4.0 times per week     Types: Marijuana    Sexual activity: Yes     Partners: Male     Birth control/protection: None     Family History   Problem Relation Age of Onset    Asthma Father     Asthma Brother     Hypertension Maternal Grandmother     Hypertension Maternal Grandfather     Hypertension Paternal Grandmother     Hypertension Paternal Grandfather     Birth defects Neg Hx     Diabetes Neg Hx       Allergies   Allergen Reactions    Shellfish Allergy Hives     Hives around throat area      Fd&C Red #40 [Red Dye #40 (Allura Red)] Diarrhea     other      Current Outpatient Medications on File Prior to Visit   Medication Sig Dispense Refill    docusate sodium (Colace) 100 MG  "capsule Take 1 capsule by mouth 2 (Two) Times a Day. 60 capsule 1    famotidine (Pepcid) 20 MG tablet Take 1 tablet by mouth Daily. 30 tablet 1    prenatal vitamin (prenatal, CLASSIC, vitamin) tablet Take 1 tablet by mouth Daily.      ferrous sulfate 325 (65 FE) MG tablet Take 1 tablet by mouth Daily With Breakfast. (Patient not taking: Reported on 2025) 60 tablet 1    ibuprofen (ADVIL,MOTRIN) 600 MG tablet Take 1 tablet by mouth Every 6 (Six) Hours As Needed for Mild Pain (Patient not taking: Reported on 2025) 40 tablet 1     No current facility-administered medications on file prior to visit.        Past obstetric, gynecological, medical, surgical, family and social history reviewed.  Relevant lab work and imaging reviewed.    Review of systems  As above in HPI     Vitals:    25 1150   BP: 117/69   BP Location: Right arm   Patient Position: Sitting   Pulse: 93   Temp: 97.3 °F (36.3 °C)   TempSrc: Temporal   Weight: 70.8 kg (156 lb)   Height: 160 cm (62.99\")       PHYSICAL EXAM   General: No acute distress  Respiratory: No increased work of breathing  Cardiac: reg rate  Abdominal: Gravid, nontender  Extremities: No significant edema  Neuro/psych: Alert and oriented, appropriate mood      ULTRASOUND   Please view full ultrasound note on Imaging tab in ViewPoint.    Cephalic presentation  Posterior placenta  MATTHEW 14.8 cm which is normal  BPP 8/8  Fetal biometry is consistent with dates EFW 56%, AC 44%      ASSESSMENT/COUNSELIN y.o.  27w6d gestation (Estimated Date of Delivery: 25).     -Pregnancy  [ X ] stable  [   ] improving [  ] worsening    Diagnoses and all orders for this visit:    1. 27 weeks gestation of pregnancy (Primary)    2. Family history of SIDS (sudden infant death syndrome)    3. History of  death    4. Family history of congenital heart defect        Family history of prior child with VSD:  Prior baby unfortunately had SIDS at 2 months of life and passed.  She " would like serial growths at [facility] for peace of mind.  I explained that VSDs are the most common single congenital cardiac malformation.  About 3 per cent of CHD is due to classical Mendelian gene effects, with correspondingly high recurrence risks in first-degree relatives, but most cardiac disease inheritance is lower than what would be predicted by Mendelian genetics.  The recurrence risks for offspring are about 10% in most instances of VSD.    Thus, a fetal ECHO is indicated.  I discussed with the patient the limitations of fetal ECHO for small VSDs. However, these often close on their own and do not cause issues. She has been scheduled and missed her echo. Will reschedule.     We did discuss option of meeting with pediatricians prior to delivery to discuss SIDS reduction.  Also discussed the option of having a NICU consult to discuss similar.  She expressed understanding and would be interested.      Summary of Plan  -Fetal ECHO scheduled-- Will reschedule.  -Routine prenatal care  -Pt desires serial growths at [facility] for significant anxiety related to her previous  loss  -Delivery 39 weeks unless indicated sooner    Follow-up: 1 month growth    Thank you for the consult and opportunity to care for this patient.  Please feel free to reach out with any questions or concerns.      I spent 10 minutes caring for this patient on this date of service. This time includes time spent by me in the following activities: preparing for the visit, reviewing tests, obtaining and/or reviewing a separately obtained history, performing a medically appropriate examination and/or evaluation, counseling and educating the patient/family/caregiver and independently interpreting results and communicating that information with the patient/family/caregiver with greater than 50% spent in counseling and coordination of care.     I spent 3 minutes on the separately reported service of US imaging not included in the time used to support  the E/M service also reported today.      Jailene Reese MD   Maternal Fetal Medicine-[facility]  Office: [phone]

## 2025-02-08 LAB — TREPONEMA PALLIDUM IGG+IGM AB [PRESENCE] IN SERUM OR PLASMA BY IMMUNOASSAY: NON REACTIVE

## 2025-02-10 ENCOUNTER — TELEPHONE (OUTPATIENT)
Dept: ULTRASOUND IMAGING | Facility: HOSPITAL | Age: 20
End: 2025-02-10

## 2025-02-10 NOTE — TELEPHONE ENCOUNTER
LVM WITH PT ON MULTIPLE OCCASIONS ATTEMPTING TO RESCHEDULE FETAL ECHO- ADVISED PT TO CALL OUR OFFICE BACK TO SCHEDULE

## 2025-02-20 ENCOUNTER — ROUTINE PRENATAL (OUTPATIENT)
Dept: OBSTETRICS AND GYNECOLOGY | Facility: CLINIC | Age: 20
End: 2025-02-20

## 2025-02-20 VITALS — DIASTOLIC BLOOD PRESSURE: 82 MMHG | SYSTOLIC BLOOD PRESSURE: 106 MMHG | BODY MASS INDEX: 28.81 KG/M2 | WEIGHT: 162.6 LBS

## 2025-02-20 DIAGNOSIS — Z82.79 FAMILY HISTORY OF CONGENITAL HEART DEFECT: ICD-10-CM

## 2025-02-20 DIAGNOSIS — Z87.59 HISTORY OF NEONATAL DEATH: ICD-10-CM

## 2025-02-20 DIAGNOSIS — Z3A.30 30 WEEKS GESTATION OF PREGNANCY: Primary | ICD-10-CM

## 2025-02-20 DIAGNOSIS — Z34.83 PRENATAL CARE, SUBSEQUENT PREGNANCY IN THIRD TRIMESTER: ICD-10-CM

## 2025-02-20 LAB
GLUCOSE UR STRIP-MCNC: NEGATIVE MG/DL
PROT UR STRIP-MCNC: NEGATIVE MG/DL

## 2025-02-20 NOTE — PROGRESS NOTES
OB follow up     Danika Owusu is a 19 y.o.  30w0d being seen today for her obstetrical visit.  Patient reports  some lower extremity swelling mainly and 1 foot and specifically in 3 toes. . Fetal movement: normal.    Her prenatal care is complicated by (and status): History of child with VSD    Review of Systems  Cramping/contractions : Negative  Vaginal bleeding: Negative  Fetal movement is normal    /82   Wt 73.8 kg (162 lb 9.6 oz)   LMP 2024 (Exact Date)   BMI 28.81 kg/m²     FHT: 142 BPM   Uterine Size: size equals dates       Assessment    Diagnoses and all orders for this visit:    1. 30 weeks gestation of pregnancy (Primary)  -     POC Urinalysis Dipstick    2. Prenatal care, subsequent pregnancy in third trimester    3. History of  death    4. Family history of congenital heart defect        1) pregnancy at 30w0d         Plan    Reviewed this stage of pregnancy  Problem list updated   Follow up in 2 weeks.  She continues follow-up with maternal-fetal medicine and is due to get scheduled for a fetal echocardiogram.  We called over while she was here today.    Gerhard Cagle MD   2025  11:28 EST

## 2025-02-25 ENCOUNTER — TRANSCRIBE ORDERS (OUTPATIENT)
Dept: ULTRASOUND IMAGING | Facility: HOSPITAL | Age: 20
End: 2025-02-25

## 2025-02-25 DIAGNOSIS — O09.299 H/O FETAL ANOMALY IN PRIOR PREGNANCY, CURRENTLY PREGNANT: Primary | ICD-10-CM

## 2025-02-26 ENCOUNTER — TRANSCRIBE ORDERS (OUTPATIENT)
Dept: ULTRASOUND IMAGING | Facility: HOSPITAL | Age: 20
End: 2025-02-26

## 2025-02-26 DIAGNOSIS — O09.299 H/O FETAL ANOMALY IN PRIOR PREGNANCY, CURRENTLY PREGNANT: Primary | ICD-10-CM

## 2025-03-05 ENCOUNTER — OFFICE VISIT (OUTPATIENT)
Dept: OBSTETRICS AND GYNECOLOGY | Facility: CLINIC | Age: 20
End: 2025-03-05

## 2025-03-05 ENCOUNTER — HOSPITAL ENCOUNTER (OUTPATIENT)
Dept: ULTRASOUND IMAGING | Facility: HOSPITAL | Age: 20
Discharge: HOME | End: 2025-03-05
Admitting: STUDENT IN AN ORGANIZED HEALTH CARE EDUCATION/TRAINING PROGRAM

## 2025-03-05 VITALS
BODY MASS INDEX: 28.6 KG/M2 | DIASTOLIC BLOOD PRESSURE: 68 MMHG | WEIGHT: 161.4 LBS | HEART RATE: 90 BPM | HEIGHT: 63 IN | OXYGEN SATURATION: 97 % | TEMPERATURE: 97.8 F | SYSTOLIC BLOOD PRESSURE: 125 MMHG

## 2025-03-05 DIAGNOSIS — O09.299 H/O FETAL ANOMALY IN PRIOR PREGNANCY, CURRENTLY PREGNANT: ICD-10-CM

## 2025-03-05 DIAGNOSIS — Z84.82 FAMILY HISTORY OF SIDS (SUDDEN INFANT DEATH SYNDROME): Primary | ICD-10-CM

## 2025-03-05 DIAGNOSIS — Z82.79 FAMILY HISTORY OF CONGENITAL HEART DEFECT: ICD-10-CM

## 2025-03-05 PROCEDURE — 76816 OB US FOLLOW-UP PER FETUS: CPT

## 2025-03-05 PROCEDURE — 76819 FETAL BIOPHYS PROFIL W/O NST: CPT

## 2025-03-05 NOTE — LETTER
2025     Gerhard Cagle MD  [address]  [facility] [address]  [place] [id number]    Patient: Danika Owusu   YOB: 2005   Date of Visit: 3/5/2025       Dear Gerhard Cagle MD    Danika Owusu was in my office today. Below is a copy of my note.    If you have questions, please do not hesitate to call me. I look forward to following Danika along with you.         Sincerely,        Estephania Hensley, KRISTIN        CC: No Recipients    Pt reports that she is doing well and denies vaginal bleeding, cramping, contractions or LOF at this time. Reports active fetal movement. Reviewed when to call OB office or present to L&D for evaluation with symptoms such as decreased fetal movement, vaginal bleeding, LOF or ctxs. Pt verbalized understanding. Denies HA, visual changes or epigastric pain. Reports new onset lower back pain. Pt states it feels almost like a pulled muscle around her lower back/hip. She said sometimes the pain is so severe she cannot stand up or walk. Denies any additional complaints at time of appointment. Next OB appointment scheduled for 3/6.      Vitals:    25 1143   BP: 125/68   Pulse: 90   Temp: 97.8 °F (36.6 °C)   SpO2: 97%         MATERNAL FETAL MEDICINE CONSULT NOTE    Dear Dr Gerhard Cagle MD:    Thank you for your kind referral of Danika Owusu.  As you know, she is a 19 y.o.   31w6d gestation (Estimated Date of Delivery: 25). This is a consult.     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 Reports normal fetal movement.   She denies headache, vision changes, shortness of breath, acute changes in edema, and RUQ pain.     REVIEW OF HISTORY  Past Medical History:   Diagnosis Date   • Ovarian cyst      No past surgical history on file.    OB History    Para Term   AB Living   3 1 1 0 1 0   SAB IAB Ectopic Molar Multiple Live Births   1 0 0 0 0 1      # Outcome Date GA Lbr Bennie/2nd Weight Sex Type Anes PTL Lv   3 Current            2 Term 10/21/23 38w1d 02:35 / 00:40 3172 g (6 lb 15.9 oz) F Vag-Spont EPI  ND      Birth Comments: scale 4      Name: Tiffany Dan      Apgar1: 8  Apgar5: 9   1 SAB 2022              Obstetric Comments   Fetus with VSD     Social History     Socioeconomic History   • Marital status: Single     Spouse name: Vincenzo Dan   • Number of children: 0   • Years of education: 12   • Highest education level: High school graduate   Tobacco Use   • Smoking status: Never     Passive exposure: Never   • Smokeless tobacco: Never   Vaping Use   • Vaping status: Every Day   • Substances: Nicotine, Flavoring   • Devices: Disposable   • Passive vaping exposure: Yes   Substance and Sexual Activity   • Alcohol use: Never   • Drug use: Not Currently     Frequency: 4.0 times per week     Types: Marijuana   • Sexual activity: Yes     Partners: Male     Birth control/protection: None     Family History   Problem Relation Age of Onset   • Asthma Father    • Asthma Brother    • Hypertension Maternal Grandmother    • Hypertension Maternal Grandfather    • Hypertension Paternal Grandmother    • Hypertension Paternal Grandfather    • Birth defects Neg Hx    • Diabetes Neg Hx       Allergies   Allergen Reactions   • Shellfish Allergy Hives     Hives around throat area     • Fd&C Red #40 [Red Dye #40 (Allura Red)] Diarrhea     other      Current Outpatient Medications on File Prior to Visit   Medication Sig Dispense Refill   • prenatal vitamin (prenatal, CLASSIC, vitamin) tablet Take 1 tablet by mouth Daily.     • [DISCONTINUED] docusate sodium (Colace) 100 MG capsule Take 1 capsule by mouth 2 (Two) Times a Day. (Patient not taking: Reported on 3/5/2025) 60 capsule 1   • [DISCONTINUED] famotidine (Pepcid) 20 MG tablet Take 1 tablet by mouth Daily. (Patient  "not taking: Reported on 3/5/2025) 30 tablet 1   • [DISCONTINUED] ferrous sulfate 325 (65 FE) MG tablet Take 1 tablet by mouth Daily With Breakfast. (Patient not taking: Reported on 2/20/2025) 60 tablet 1   • [DISCONTINUED] ibuprofen (ADVIL,MOTRIN) 600 MG tablet Take 1 tablet by mouth Every 6 (Six) Hours As Needed for Mild Pain (Patient not taking: Reported on 2/20/2025) 40 tablet 1     No current facility-administered medications on file prior to visit.        Common labs          5/18/2024    19:36 10/15/2024    11:06 2/4/2025    10:51   Common Labs   Glucose 95      BUN 6      Creatinine 0.65      Sodium 137      Potassium 3.8      Chloride 104      Calcium 9.3      Albumin 4.7      Total Bilirubin 0.2      Alkaline Phosphatase 81      AST (SGOT) 14      ALT (SGPT) 12      WBC 12.91  14.9     Hemoglobin 14.2  13.8  12.0    Hematocrit 44.3  42.0  36.6    Platelets 488  488       Past obstetric, gynecological, medical, surgical, family and social history reviewed.  Relevant lab work and imaging reviewed.    REVIEW OF SYSTEMS  Constitutional:  denies fever, chills, malaise.   ENT/Mouth:  denies sore throat, tinnitus  Eyes: denies vision changes/pain  CV:  denies chest pain  Respiratory:  denies cough/SOB  GI:  denies N/V, diarrhea, abdominal pain.    :   denies dysuria  Skin:  denies lesions or pruritus   Neuro:  denies weakness, focal neurologic symptoms    Vitals:    03/05/25 1143   BP: 125/68   BP Location: Right arm   Patient Position: Sitting   Pulse: 90   Temp: 97.8 °F (36.6 °C)   TempSrc: Temporal   SpO2: 97%   Weight: 73.2 kg (161 lb 6.4 oz)   Height: 160 cm (63\")       Wt Readings from Last 3 Encounters:   03/05/25 73.2 kg (161 lb 6.4 oz)   02/20/25 73.8 kg (162 lb 9.6 oz)   02/05/25 70.8 kg (156 lb)     BP Readings from Last 3 Encounters:   03/05/25 125/68   02/20/25 106/82   02/05/25 117/69     Pulse Readings from Last 3 Encounters:   03/05/25 90   02/05/25 93   01/08/25 95     Pregnancy " Episode    PHYSICAL EXAM   General: No acute distress, pleasant, AAO x 3  Respiratory: No increased work of breathing, speaking in complete sentences without difficulty, symmetric chest rise  Cardiac: Regular rate   abdominal: Gravid, nontender  Extremities: No significant edema  Neuro/psych: Awake, Alert and oriented, appropriate mood/affect    ULTRASOUND   A full ultrasound report can be found under imaging tab        ASSESSMENT AND PLAN  Diagnoses and all orders for this visit:    1. Family history of SIDS (sudden infant death syndrome) (Primary)    2. Family history of congenital heart defect       FAMILY HISTORY OF PRIOR CHILD WITH VSD  Previously Counseled.   Prior baby unfortunately had SIDS at 2 months of life and passed.  She would like serial growths at [facility] for peace of mind.  I explained that VSDs are the most common single congenital cardiac malformation.  About 3 per cent of CHD is due to classical Mendelian gene effects, with correspondingly high recurrence risks in first-degree relatives, but most cardiac disease inheritance is lower than what would be predicted by Mendelian genetics.  The recurrence risks for offspring are about 10% in most instances of VSD.    Thus, a fetal ECHO is indicated.  I discussed with the patient the limitations of fetal ECHO for small VSDs. However, these often close on their own and do not cause issues. She has been scheduled and missed her echo twice. Will reschedule.     We did discuss option of meeting with pediatricians prior to delivery to discuss SIDS reduction.  Also discussed the option of having a NICU consult to discuss similar.  She expressed understanding and would be interested.      Summary of Plan  -Fetal ECHO -- no show x2 - please rescheduled  -Continue routine prenatal care with primary ob team   -Serial growth ultrasounds every 4  weeks (MFM)   -Pt desires serial growths at [facility] for significant anxiety related to her previous  loss  -Fetal  movement instructions given continue daily until delivery; instructed to report to labor and delivery if cannot achieve more than 10 kicks in one hour or if she perceives a decrease in fetal movement  -At this time, delivery is recommended at 39 weeks, unless indicated sooner for maternal or fetal reasons    Follow-up: 1 month growth    Thank you for the consult and opportunity to care for this patient.  Please feel free to reach out with any questions or concerns.      I spent 30 minutes caring for this patient on this date of service. This time includes time spent by me in the following activities: preparing for the visit, reviewing tests, obtaining and/or reviewing a separately obtained history, performing a medically appropriate examination and/or evaluation, counseling and educating the patient/family/caregiver and independently interpreting results and communicating that information with the patient/family/caregiver with greater than 50% spent in counseling and coordination of care.     KRISTIN Womack  3/5/2025  Maternal Fetal Medicine-[facility]  Office: [phone]  [email or web address]

## 2025-03-05 NOTE — PROGRESS NOTES
Pt reports that she is doing well and denies vaginal bleeding, cramping, contractions or LOF at this time. Reports active fetal movement. Reviewed when to call OB office or present to L&D for evaluation with symptoms such as decreased fetal movement, vaginal bleeding, LOF or ctxs. Pt verbalized understanding. Denies HA, visual changes or epigastric pain. Reports new onset lower back pain. Pt states it feels almost like a pulled muscle around her lower back/hip. She said sometimes the pain is so severe she cannot stand up or walk. Denies any additional complaints at time of appointment. Next OB appointment scheduled for 3/6.      Vitals:    03/05/25 1143   BP: 125/68   Pulse: 90   Temp: 97.8 °F (36.6 °C)   SpO2: 97%

## 2025-03-05 NOTE — PROGRESS NOTES
MATERNAL FETAL MEDICINE CONSULT NOTE    Dear Dr Gerhard Cagle MD:    Thank you for your kind referral of Danika Owusu.  As you know, she is a 19 y.o.   31w6d gestation (Estimated Date of Delivery: 25). This is a consult.     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 Reports normal fetal movement.   She denies headache, vision changes, shortness of breath, acute changes in edema, and RUQ pain.     REVIEW OF HISTORY  Past Medical History:   Diagnosis Date    Ovarian cyst      No past surgical history on file.    OB History    Para Term  AB Living   3 1 1 0 1 0   SAB IAB Ectopic Molar Multiple Live Births   1 0 0 0 0 1      # Outcome Date GA Lbr Bennie/2nd Weight Sex Type Anes PTL Lv   3 Current            2 Term 10/21/23 38w1d 02:35 / 00:40 3172 g (6 lb 15.9 oz) F Vag-Spont EPI  ND      Birth Comments: scale 4      Name: Tiffany Kerr Sy      Apgar1: 8  Apgar5: 9   1 SAB 2022              Obstetric Comments   Fetus with VSD     Social History     Socioeconomic History    Marital status: Single     Spouse name: Vincenzo Dan    Number of children: 0    Years of education: 12    Highest education level: High school graduate   Tobacco Use    Smoking status: Never     Passive exposure: Never    Smokeless tobacco: Never   Vaping Use    Vaping status: Every Day    Substances: Nicotine, Flavoring    Devices: Disposable    Passive vaping exposure: Yes   Substance and Sexual Activity    Alcohol use: Never    Drug use: Not Currently     Frequency: 4.0 times per week     Types: Marijuana    Sexual activity: Yes     Partners: Male     Birth control/protection: None     Family History   Problem Relation Age of Onset    Asthma Father     Asthma Brother     Hypertension Maternal Grandmother      Hypertension Maternal Grandfather     Hypertension Paternal Grandmother     Hypertension Paternal Grandfather     Birth defects Neg Hx     Diabetes Neg Hx       Allergies   Allergen Reactions    Shellfish Allergy Hives     Hives around throat area      Fd&C Red #40 [Red Dye #40 (Allura Red)] Diarrhea     other      Current Outpatient Medications on File Prior to Visit   Medication Sig Dispense Refill    prenatal vitamin (prenatal, CLASSIC, vitamin) tablet Take 1 tablet by mouth Daily.      [DISCONTINUED] docusate sodium (Colace) 100 MG capsule Take 1 capsule by mouth 2 (Two) Times a Day. (Patient not taking: Reported on 3/5/2025) 60 capsule 1    [DISCONTINUED] famotidine (Pepcid) 20 MG tablet Take 1 tablet by mouth Daily. (Patient not taking: Reported on 3/5/2025) 30 tablet 1    [DISCONTINUED] ferrous sulfate 325 (65 FE) MG tablet Take 1 tablet by mouth Daily With Breakfast. (Patient not taking: Reported on 2/20/2025) 60 tablet 1    [DISCONTINUED] ibuprofen (ADVIL,MOTRIN) 600 MG tablet Take 1 tablet by mouth Every 6 (Six) Hours As Needed for Mild Pain (Patient not taking: Reported on 2/20/2025) 40 tablet 1     No current facility-administered medications on file prior to visit.        Common labs          5/18/2024    19:36 10/15/2024    11:06 2/4/2025    10:51   Common Labs   Glucose 95      BUN 6      Creatinine 0.65      Sodium 137      Potassium 3.8      Chloride 104      Calcium 9.3      Albumin 4.7      Total Bilirubin 0.2      Alkaline Phosphatase 81      AST (SGOT) 14      ALT (SGPT) 12      WBC 12.91  14.9     Hemoglobin 14.2  13.8  12.0    Hematocrit 44.3  42.0  36.6    Platelets 488  488       Past obstetric, gynecological, medical, surgical, family and social history reviewed.  Relevant lab work and imaging reviewed.    REVIEW OF SYSTEMS  Constitutional:  denies fever, chills, malaise.   ENT/Mouth:  denies sore throat, tinnitus  Eyes: denies vision changes/pain  CV:  denies chest pain  Respiratory:   "denies cough/SOB  GI:  denies N/V, diarrhea, abdominal pain.    :   denies dysuria  Skin:  denies lesions or pruritus   Neuro:  denies weakness, focal neurologic symptoms    Vitals:    03/05/25 1143   BP: 125/68   BP Location: Right arm   Patient Position: Sitting   Pulse: 90   Temp: 97.8 °F (36.6 °C)   TempSrc: Temporal   SpO2: 97%   Weight: 73.2 kg (161 lb 6.4 oz)   Height: 160 cm (63\")       Wt Readings from Last 3 Encounters:   03/05/25 73.2 kg (161 lb 6.4 oz)   02/20/25 73.8 kg (162 lb 9.6 oz)   02/05/25 70.8 kg (156 lb)     BP Readings from Last 3 Encounters:   03/05/25 125/68   02/20/25 106/82   02/05/25 117/69     Pulse Readings from Last 3 Encounters:   03/05/25 90   02/05/25 93   01/08/25 95     Pregnancy Episode    PHYSICAL EXAM   General: No acute distress, pleasant, AAO x 3  Respiratory: No increased work of breathing, speaking in complete sentences without difficulty, symmetric chest rise  Cardiac: Regular rate   abdominal: Gravid, nontender  Extremities: No significant edema  Neuro/psych: Awake, Alert and oriented, appropriate mood/affect    ULTRASOUND   A full ultrasound report can be found under imaging tab        ASSESSMENT AND PLAN  Diagnoses and all orders for this visit:    1. Family history of SIDS (sudden infant death syndrome) (Primary)    2. Family history of congenital heart defect       FAMILY HISTORY OF PRIOR CHILD WITH VSD  Previously Counseled.   Prior baby unfortunately had SIDS at 2 months of life and passed.  She would like serial growths at [facility] for peace of mind.  I explained that VSDs are the most common single congenital cardiac malformation.  About 3 per cent of CHD is due to classical Mendelian gene effects, with correspondingly high recurrence risks in first-degree relatives, but most cardiac disease inheritance is lower than what would be predicted by Mendelian genetics.  The recurrence risks for offspring are about 10% in most instances of VSD.    Thus, a fetal ECHO is " indicated.  I discussed with the patient the limitations of fetal ECHO for small VSDs. However, these often close on their own and do not cause issues. She has been scheduled and missed her echo twice. Will reschedule.     We did discuss option of meeting with pediatricians prior to delivery to discuss SIDS reduction.  Also discussed the option of having a NICU consult to discuss similar.  She expressed understanding and would be interested.      Summary of Plan  -Fetal ECHO -- no show x2 - please rescheduled  -Continue routine prenatal care with primary ob team   -Serial growth ultrasounds every 4  weeks (MFM)   -Pt desires serial growths at MFM for significant anxiety related to her previous  loss  -Fetal movement instructions given continue daily until delivery; instructed to report to labor and delivery if cannot achieve more than 10 kicks in one hour or if she perceives a decrease in fetal movement  -At this time, delivery is recommended at 39 weeks, unless indicated sooner for maternal or fetal reasons    Follow-up: 1 month growth    Thank you for the consult and opportunity to care for this patient.  Please feel free to reach out with any questions or concerns.      I spent 30 minutes caring for this patient on this date of service. This time includes time spent by me in the following activities: preparing for the visit, reviewing tests, obtaining and/or reviewing a separately obtained history, performing a medically appropriate examination and/or evaluation, counseling and educating the patient/family/caregiver and independently interpreting results and communicating that information with the patient/family/caregiver with greater than 50% spent in counseling and coordination of care.     KRISTIN Womack  3/5/2025  [facility]  Office: [phone]  [email or web address]

## 2025-03-06 ENCOUNTER — ROUTINE PRENATAL (OUTPATIENT)
Dept: OBSTETRICS AND GYNECOLOGY | Facility: CLINIC | Age: 20
End: 2025-03-06
Payer: MEDICAID

## 2025-03-06 VITALS — DIASTOLIC BLOOD PRESSURE: 67 MMHG | WEIGHT: 161.8 LBS | BODY MASS INDEX: 28.66 KG/M2 | SYSTOLIC BLOOD PRESSURE: 117 MMHG

## 2025-03-06 DIAGNOSIS — Z34.83 PRENATAL CARE, SUBSEQUENT PREGNANCY IN THIRD TRIMESTER: ICD-10-CM

## 2025-03-06 DIAGNOSIS — Z87.59 HISTORY OF NEONATAL DEATH: ICD-10-CM

## 2025-03-06 DIAGNOSIS — Z82.79 FAMILY HISTORY OF CONGENITAL HEART DEFECT: ICD-10-CM

## 2025-03-06 DIAGNOSIS — Z3A.32 32 WEEKS GESTATION OF PREGNANCY: Primary | ICD-10-CM

## 2025-03-06 LAB
GLUCOSE UR STRIP-MCNC: NEGATIVE MG/DL
PROT UR STRIP-MCNC: NEGATIVE MG/DL

## 2025-03-06 NOTE — PROGRESS NOTES
OB follow up     Danika Owusu is a 19 y.o.  32w0d being seen today for her obstetrical visit.  Patient reports no complaints. Fetal movement: normal.  I had normal follow-up visit and ultrasound with MFM yesterday.  Her prenatal care is complicated by (and status): History of child with VSD that later  of SIDS    Review of Systems  Cramping/contractions : Negative  Vaginal bleeding: Negative  Fetal movement is normal    LMP 2024 (Exact Date)     FHT: 140 BPM   Uterine Size: size equals dates       Assessment    Diagnoses and all orders for this visit:    1. 32 weeks gestation of pregnancy (Primary)  -     POC Urinalysis Dipstick  -     Tdap Vaccine Greater Than or Equal To 6yo IM    2. Prenatal care, subsequent pregnancy in third trimester    3. Family history of congenital heart defect    4. History of  death        1) pregnancy at 32w0d         Plan    Reviewed this stage of pregnancy  Problem list updated   Follow up in 2 weeks.  Tdap given today.    Gerahrd Cagle MD   3/6/2025  10:17 EST

## 2025-03-13 ENCOUNTER — TELEPHONE (OUTPATIENT)
Dept: ULTRASOUND IMAGING | Facility: HOSPITAL | Age: 20
End: 2025-03-13

## 2025-03-15 ENCOUNTER — HOSPITAL ENCOUNTER (EMERGENCY)
Facility: HOSPITAL | Age: 20
Discharge: HOME | End: 2025-03-15
Attending: OBSTETRICS & GYNECOLOGY | Admitting: OBSTETRICS & GYNECOLOGY

## 2025-03-15 VITALS
RESPIRATION RATE: 16 BRPM | TEMPERATURE: 98 F | HEART RATE: 105 BPM | DIASTOLIC BLOOD PRESSURE: 75 MMHG | SYSTOLIC BLOOD PRESSURE: 121 MMHG

## 2025-03-15 LAB
BILIRUB UR QL STRIP: NEGATIVE
CLARITY UR: CLEAR
COLOR UR: YELLOW
GLUCOSE UR STRIP-MCNC: NEGATIVE MG/DL
HGB UR QL STRIP.AUTO: NEGATIVE
KETONES UR QL STRIP: ABNORMAL
LEUKOCYTE ESTERASE UR QL STRIP.AUTO: NEGATIVE
NITRITE UR QL STRIP: NEGATIVE
PH UR STRIP.AUTO: 7 [PH] (ref 5–8)
PROT UR QL STRIP: NEGATIVE
SP GR UR STRIP: 1.01 (ref 1–1.03)
UROBILINOGEN UR QL STRIP: ABNORMAL

## 2025-03-15 PROCEDURE — 87086 URINE CULTURE/COLONY COUNT: CPT | Performed by: OBSTETRICS & GYNECOLOGY

## 2025-03-15 PROCEDURE — 81003 URINALYSIS AUTO W/O SCOPE: CPT | Performed by: OBSTETRICS & GYNECOLOGY

## 2025-03-15 PROCEDURE — 59025 FETAL NON-STRESS TEST: CPT

## 2025-03-15 PROCEDURE — 99284 EMERGENCY DEPT VISIT MOD MDM: CPT | Performed by: OBSTETRICS & GYNECOLOGY

## 2025-03-15 NOTE — OBED NOTES
[facility] Note OB    Patient Name: Danika Owusu  YOB: 2005  MRN: [id number]  Admission Date: 3/15/2025  2:31 AM  Date of Service: 3/15/2025    Chief Complaint: Vaginal Bleeding (Pt noticed vaginal bleeding at home light pink around 12:50am when she wiped  then got darker red in color after few mins. Denies lof, some decreased fetal movement since start the spotting. )        Subjective     Danika Owusu is a 20 y.o. female  at 33w2d with Estimated Date of Delivery: 25 who presents with the chief complaint listed above.  Patient reports she had a bit of cramping prior to this discharge episode but none now.  Patient denies recent intercourse or anything in the pelvis.  Patient has an unfortunate history of her first baby dying at 2 months from SIDS.  Since she has been here patient has felt the baby move normally     She sees Gerhard Cagle MD for her prenatal care. Her pregnancy has been complicated by: History of  death, family history of congenital heart defects    She describes fetal movement as normal.  She denies rupture of membranes.  She is not having active vaginal bleeding. She is not feeling contractions.        Objective   Patient Active Problem List    Diagnosis      (spontaneous vaginal delivery) [O80]     False labor after 37 weeks of gestation without delivery [O47.1]     Advanced care planning/counseling discussion [Z71.89]     Fetal cardiac anomaly affecting pregnancy, antepartum [O35.BXX0]         OB History    Para Term  AB Living   3 1 1 0 1 0   SAB IAB Ectopic Molar Multiple Live Births   1 0 0 0 0 1      # Outcome Date GA Lbr Bennie/2nd Weight Sex Type Anes PTL Lv   3 Current            2 Term 10/21/23 38w1d 02:35 / 00:40 3172 g (6 lb 15.9 oz) F Vag-Spont EPI  ND      Birth Comments: scale 4      Name: Tiffany Dan      Apgar1: 8  Apgar5: 9   1 SAB 2022              Obstetric Comments   Fetus with VSD        Past Medical History:    Diagnosis Date    Ovarian cyst        No past surgical history on file.    No current facility-administered medications on file prior to encounter.     Current Outpatient Medications on File Prior to Encounter   Medication Sig Dispense Refill    prenatal vitamin (prenatal, CLASSIC, vitamin) tablet Take 1 tablet by mouth Daily.         Allergies   Allergen Reactions    Shellfish Allergy Hives     Hives around throat area      Fd&C Red #40 [Red Dye #40 (Allura Red)] Diarrhea     other       Family History   Problem Relation Age of Onset    Asthma Father     Asthma Brother     Hypertension Maternal Grandmother     Hypertension Maternal Grandfather     Hypertension Paternal Grandmother     Hypertension Paternal Grandfather     Birth defects Neg Hx     Diabetes Neg Hx        Social History     Socioeconomic History    Marital status: Single     Spouse name: Vincenzo Dan    Number of children: 0    Years of education: 12    Highest education level: High school graduate   Tobacco Use    Smoking status: Never     Passive exposure: Never    Smokeless tobacco: Never   Vaping Use    Vaping status: Every Day    Substances: Nicotine, Flavoring    Devices: Disposable    Passive vaping exposure: Yes   Substance and Sexual Activity    Alcohol use: Never    Drug use: Not Currently     Frequency: 4.0 times per week     Types: Marijuana    Sexual activity: Yes     Partners: Male     Birth control/protection: None           Review of Systems   Constitutional:  Negative for chills and fever.   HENT: Negative.     Eyes:  Negative for photophobia and visual disturbance.   Respiratory:  Negative for shortness of breath.    Cardiovascular:  Negative for chest pain.   Gastrointestinal:  Negative for nausea.   Genitourinary:  Positive for vaginal discharge.   Psychiatric/Behavioral:  The patient is not nervous/anxious.           PHYSICAL EXAM:      VITAL SIGNS:  Vitals:    03/15/25 0250 03/15/25 0253   BP:  121/75   Pulse:  105   Resp:  16 16   Temp: 98.2 °F (36.8 °C) 98 °F (36.7 °C)   TempSrc: Oral Oral            FHT'S:    130 with moderate variability and accelerations, much fetal movement heard at bedside                                     PHYSICAL EXAM:      General: well developed; well nourished  no acute distress  mentation appropriate   Heart: Not performed.   Lungs   breathing is unlabored   Abdomen: Gravid and non tender     Extremities: trace edema, DTRs 1 plus, no clonus       Cervix: Per myself, no blood noted  Cervical Dilation (cm): 0  Cervical Consistency: soft  Cervical Position: anterior     Contractions:   irregular                    LABS AND TESTING ORDERED:  Uterine and fetal monitoring  Urinalysis  Serial cervical exams    LAB RESULTS:    No results found for this or any previous visit (from the past 24 hours).    Lab Results   Component Value Date    ABO O 10/15/2024    RH Positive 10/15/2024       Lab Results   Component Value Date    STREPGPB Negative 10/12/2023                 External Prenatal Results       Pregnancy Outside Results - Transcribed From Office Records - See Scanned Records For Details       Test Value Date Time    ABO  O  10/15/24 1106    Rh  Positive  10/15/24 1106    Antibody Screen  Negative  10/15/24 1106    Varicella IgG       Rubella  1.59 index 10/15/24 1106    Hgb  12.0 g/dL 02/04/25 1051       13.8 g/dL 10/15/24 1106    Hct  36.6 % 02/04/25 1051       42.0 % 10/15/24 1106    HgB A1c        1h GTT  107 mg/dL 02/04/25 1051    3h GTT Fasting       3h GTT 1 hour       3h GTT 2 hour       3h GTT 3 hour        Gonorrhea (discrete)  Negative  09/17/24 1544    Chlamydia (discrete)  Negative  09/17/24 1544    RPR  Non Reactive  10/15/24 1106    Syphils cascade: TP-Ab (FTA)  Non Reactive  02/04/25 1051    TP-Ab  Non Reactive  02/04/25 1051    TP-Ab (EIA)       TPPA       HBsAg  Negative  10/15/24 1106    Herpes Simplex Virus PCR       Herpes Simplex VIrus Culture       HIV  Non Reactive  10/15/24 1106     Hep C RNA Quant PCR       Hep C Antibody       AFP       NIPT       Cystic Fibrosis (Drew)       Cystic Fibroisis        Spinal Muscular atrophy       Fragile X       Group B Strep       GBS Susceptibility to Clindamycin       GBS Susceptibility to Erythromycin       Fetal Fibronectin       Genetic Testing, Maternal Blood                 Drug Screening       Test Value Date Time    Urine Drug Screen       Amphetamine Screen  Negative ng/mL 24 1544    Barbiturate Screen  Negative ng/mL 24 1544    Benzodiazepine Screen  Negative ng/mL 24 1544    Methadone Screen  Negative ng/mL 24 1544    Phencyclidine Screen  Negative ng/mL 24 1544    Opiates Screen       THC Screen       Cocaine Screen       Propoxyphene Screen  Negative ng/mL 24 1544    Buprenorphine Screen       Methamphetamine Screen       Oxycodone Screen       Tricyclic Antidepressants Screen                 Legend    ^: Historical                              Impression:   @ 33w2d .  Final Diagnosis: Patient reported vaginal spotting with voiding, no blood noted,  labor ruled out, OB exam benign    Plan:  1. Discharge to home.    2. Plan of care has been reviewed with patient along with risks, benefits of treatment.   All questions have been answered. Call health care provider for any further concerns and keep office appointments.  3.  Comfort measures, kick counts,  labor precautions      I discussed the patients findings and my recommendations with patient, family, and nursing staff      Marjan Gibbons MD  3/15/2025  03:00 EDT

## 2025-03-16 LAB — BACTERIA SPEC AEROBE CULT: NO GROWTH

## 2025-03-20 ENCOUNTER — ROUTINE PRENATAL (OUTPATIENT)
Dept: OBSTETRICS AND GYNECOLOGY | Facility: CLINIC | Age: 20
End: 2025-03-20

## 2025-03-20 VITALS — DIASTOLIC BLOOD PRESSURE: 84 MMHG | WEIGHT: 167.2 LBS | BODY MASS INDEX: 29.62 KG/M2 | SYSTOLIC BLOOD PRESSURE: 115 MMHG

## 2025-03-20 DIAGNOSIS — Z34.83 PRENATAL CARE, SUBSEQUENT PREGNANCY IN THIRD TRIMESTER: Primary | ICD-10-CM

## 2025-03-20 DIAGNOSIS — Z3A.34 34 WEEKS GESTATION OF PREGNANCY: ICD-10-CM

## 2025-03-20 DIAGNOSIS — Z84.82 FAMILY HISTORY OF SIDS (SUDDEN INFANT DEATH SYNDROME): ICD-10-CM

## 2025-03-20 DIAGNOSIS — Z87.59 HISTORY OF NEONATAL DEATH: ICD-10-CM

## 2025-03-20 NOTE — PROGRESS NOTES
OB follow up     Danika Owusu is a 20 y.o.  34w0d being seen today for her obstetrical visit.  Patient reports no complaints. Fetal movement: normal.    Her prenatal care is complicated by (and status): Prior child with VSD and child  of SIDS    Review of Systems  Cramping/contractions : Occasional  Vaginal bleeding: Negative  Fetal movement is normal    /84   Wt 75.8 kg (167 lb 3.2 oz)   LMP 2024 (Exact Date)   BMI 29.62 kg/m²     FHT: 134 BPM   Uterine Size: size equals dates       Assessment    Diagnoses and all orders for this visit:    1. Prenatal care, subsequent pregnancy in third trimester (Primary)  -     Ambulatory Referral to Obstetrics / Gynecology    2. 34 weeks gestation of pregnancy  -     Ambulatory Referral to Obstetrics / Gynecology    3. History of  death    4. Family history of SIDS (sudden infant death syndrome)  -     Ambulatory Referral to Obstetrics / Gynecology        1) pregnancy at 34w0d         Plan    Reviewed this stage of pregnancy  Problem list updated   Follow up in 2 weeks.  Patient and her partner report that they are going to be moving to [facility] in about 3 weeks.  I have put in a referral request for a [facility] to take her on further remainder of her pregnancy and delivery.  She understands the importance of establishing care with a group and I will see her 1 more time in 2 weeks and do her group B strep screen and exam.  She has another appointment with [facility] at about that same time.    Gerhard Cagle MD   3/20/2025  11:39 EDT

## 2025-04-02 ENCOUNTER — OFFICE VISIT (OUTPATIENT)
Dept: OBSTETRICS AND GYNECOLOGY | Facility: CLINIC | Age: 20
End: 2025-04-02

## 2025-04-02 ENCOUNTER — HOSPITAL ENCOUNTER (OUTPATIENT)
Dept: ULTRASOUND IMAGING | Facility: HOSPITAL | Age: 20
Discharge: HOME | End: 2025-04-02
Admitting: STUDENT IN AN ORGANIZED HEALTH CARE EDUCATION/TRAINING PROGRAM

## 2025-04-02 VITALS
WEIGHT: 172.8 LBS | DIASTOLIC BLOOD PRESSURE: 78 MMHG | HEART RATE: 105 BPM | BODY MASS INDEX: 30.62 KG/M2 | OXYGEN SATURATION: 99 % | HEIGHT: 63 IN | TEMPERATURE: 96.9 F | SYSTOLIC BLOOD PRESSURE: 120 MMHG

## 2025-04-02 DIAGNOSIS — Z84.82 FAMILY HISTORY OF SIDS (SUDDEN INFANT DEATH SYNDROME): Primary | ICD-10-CM

## 2025-04-02 DIAGNOSIS — Z82.79 FAMILY HISTORY OF CONGENITAL HEART DEFECT: ICD-10-CM

## 2025-04-02 DIAGNOSIS — O09.299 H/O FETAL ANOMALY IN PRIOR PREGNANCY, CURRENTLY PREGNANT: ICD-10-CM

## 2025-04-02 PROCEDURE — 76816 OB US FOLLOW-UP PER FETUS: CPT

## 2025-04-02 PROCEDURE — 76819 FETAL BIOPHYS PROFIL W/O NST: CPT

## 2025-04-02 NOTE — PROGRESS NOTES
Pt reports that she is doing well and denies vaginal bleeding, or LOF at this time. Reports feeling some ctx that started last night and went away after a couple hours. Pt states she is feeling them again, reporting cramping/ctx pain every 15 min. Reports active fetal movement. Reviewed when to call OB office or present to L&D for evaluation with symptoms such as decreased fetal movement, vaginal bleeding, LOF or ctxs. Pt verbalized understanding. Denies HA, visual changes or epigastric pain. Pt also reporting increased itchiness x3 days on arms, hands, and legs. Denies any additional complaints at time of appointment. Next OB appointment scheduled for 4/3.     Vitals:    04/02/25 1201   BP: 120/78   Pulse: 105   Temp: 96.9 °F (36.1 °C)   SpO2: 99%

## 2025-04-02 NOTE — PROGRESS NOTES
MATERNAL FETAL MEDICINE CONSULT NOTE    Dear Dr Gerhard Cagle MD:    Thank you for your kind referral of Danika Owusu.  As you know, she is a 20 y.o.   35w6d gestation (Estimated Date of Delivery: 25). This is a consult.     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 Reports normal fetal movement.   She denies headache, vision changes, shortness of breath, acute changes in edema, and RUQ pain.     REVIEW OF HISTORY  Past Medical History:   Diagnosis Date    Ovarian cyst      No past surgical history on file.    OB History    Para Term  AB Living   3 1 1 0 1 0   SAB IAB Ectopic Molar Multiple Live Births   1 0 0 0 0 1      # Outcome Date GA Lbr Bennie/2nd Weight Sex Type Anes PTL Lv   3 Current            2 Term 10/21/23 38w1d 02:35 / 00:40 3172 g (6 lb 15.9 oz) F Vag-Spont EPI  ND      Birth Comments: scale 4      Name: Tiffany Dan      Apgar1: 8  Apgar5: 9   1 SAB 2022              Obstetric Comments   Fetus with VSD     Social History     Socioeconomic History    Marital status: Single     Spouse name: Vincenzo Dan    Number of children: 0    Years of education: 12    Highest education level: High school graduate   Tobacco Use    Smoking status: Never     Passive exposure: Never    Smokeless tobacco: Never   Vaping Use    Vaping status: Every Day    Substances: Nicotine, Flavoring    Devices: Disposable    Passive vaping exposure: Yes   Substance and Sexual Activity    Alcohol use: Never    Drug use: Not Currently     Frequency: 4.0 times per week     Types: Marijuana    Sexual activity: Yes     Partners: Male     Birth control/protection: None     Family History   Problem Relation Age of Onset    Asthma Father     Asthma  "Brother     Hypertension Maternal Grandmother     Hypertension Maternal Grandfather     Hypertension Paternal Grandmother     Hypertension Paternal Grandfather     Birth defects Neg Hx     Diabetes Neg Hx       Allergies   Allergen Reactions    Shellfish Allergy Hives     Hives around throat area      Fd&C Red #40 [Red Dye #40 (Allura Red)] Diarrhea     other      Current Outpatient Medications on File Prior to Visit   Medication Sig Dispense Refill    prenatal vitamin (prenatal, CLASSIC, vitamin) tablet Take 1 tablet by mouth Daily.       No current facility-administered medications on file prior to visit.        Common labs          5/18/2024    19:36 10/15/2024    11:06 2/4/2025    10:51   Common Labs   Glucose 95      BUN 6      Creatinine 0.65      Sodium 137      Potassium 3.8      Chloride 104      Calcium 9.3      Albumin 4.7      Total Bilirubin 0.2      Alkaline Phosphatase 81      AST (SGOT) 14      ALT (SGPT) 12      WBC 12.91  14.9     Hemoglobin 14.2  13.8  12.0    Hematocrit 44.3  42.0  36.6    Platelets 488  488       Past obstetric, gynecological, medical, surgical, family and social history reviewed.  Relevant lab work and imaging reviewed.    REVIEW OF SYSTEMS  Constitutional:  denies fever, chills, malaise.   ENT/Mouth:  denies sore throat, tinnitus  Eyes: denies vision changes/pain  CV:  denies chest pain  Respiratory:  denies cough/SOB  GI:  denies N/V, diarrhea, abdominal pain.    :   denies dysuria  Skin:  denies lesions or pruritus   Neuro:  denies weakness, focal neurologic symptoms    Vitals:    04/02/25 1201   BP: 120/78   BP Location: Right arm   Patient Position: Sitting   Pulse: 105   Temp: 96.9 °F (36.1 °C)   TempSrc: Temporal   SpO2: 99%   Weight: 78.4 kg (172 lb 12.8 oz)   Height: 160 cm (63\")       Wt Readings from Last 3 Encounters:   04/02/25 78.4 kg (172 lb 12.8 oz)   03/20/25 75.8 kg (167 lb 3.2 oz)   03/06/25 73.4 kg (161 lb 12.8 oz)     BP Readings from Last 3 Encounters: "   04/02/25 120/78   03/20/25 115/84   03/15/25 121/75     Pulse Readings from Last 3 Encounters:   04/02/25 105   03/15/25 105   03/05/25 90     Pregnancy Episode    PHYSICAL EXAM   General: No acute distress, pleasant, AAO x 3  Respiratory: No increased work of breathing, speaking in complete sentences without difficulty, symmetric chest rise  Cardiac: Regular rate   abdominal: Gravid, nontender  Extremities: No significant edema  Neuro/psych: Awake, Alert and oriented, appropriate mood/affect    ULTRASOUND   A full ultrasound report can be found under imaging tab  Cephalic presentation  Posterior right placenta  Fetal biometry is consistent with dates EFW 57%, AC 87%  MATTHEW 9.8 cm which is normal  BPP 6/8 (-2 breathing)    NST  DATE: April 2, 2025  PROCEDURE: nonstress test  INTERPRETING PROVIDER: KRISTIN Womack  GESTATIONAL AGE: 35w6d gestation  DIAGNOSIS: Family H/O CHD and SIDS  INDICATION: BPP 6/8 (-2 for breathing)   START TIME: 13:06  END TIME: 13:29  FINDINGS: NST: 125 bpm, reactive, moderate variability (amplitude 6-25 bpm), present,  no decels noted .   TOCOMETRY: quiet  *Patient was monitored for a minimum of 20 minutes.      ASSESSMENT AND PLAN  Diagnoses and all orders for this visit:    1. Family history of SIDS (sudden infant death syndrome) (Primary)    2. Family history of congenital heart defect         FAMILY HISTORY OF PRIOR CHILD WITH VSD  Previously Counseled.   Prior baby unfortunately had SIDS at 2 months of life and passed.  She would like serial growths at [facility] for peace of mind.  I explained that VSDs are the most common single congenital cardiac malformation.  About 3 per cent of CHD is due to classical Mendelian gene effects, with correspondingly high recurrence risks in first-degree relatives, but most cardiac disease inheritance is lower than what would be predicted by Mendelian genetics.  The recurrence risks for offspring are about 10% in most instances of VSD.    Thus, a fetal  ECHO is indicated.  I discussed with the patient the limitations of fetal ECHO for small VSDs. However, these often close on their own and do not cause issues. She has been scheduled and missed her echo twice. Will reschedule.     Pt had fetal echo scheduled x 3 and no showed them all so she has not had a fetal echo.   Pt reports they are moving to [place] but she will inform the pediatrician of family h/o CHD. Pt aware they can always refer to peds cardiology if any concern after baby is born since she did not get a fetal echo done.       Summary of Plan  -Fetal ECHO -- no show x3   -Continue routine prenatal care with primary ob team   -Serial growth ultrasounds every 4  weeks (MFM)   -Pt desires serial growths at [facility] for significant anxiety related to her previous  loss  -Fetal movement instructions given continue daily until delivery; instructed to report to labor and delivery if cannot achieve more than 10 kicks in one hour or if she perceives a decrease in fetal movement  -At this time, delivery is recommended at 39 weeks, unless indicated sooner for maternal or fetal reasons    Follow-up: No follow up with MFM scheduled, but I am happy to see for follow up at request of primary obstetrician  Congratulations and Best Wishes!!!    Thank you for the consult and opportunity to care for this patient.  Please feel free to reach out with any questions or concerns.      I spent 30 minutes caring for this patient on this date of service. This time includes time spent by me in the following activities: preparing for the visit, reviewing tests, obtaining and/or reviewing a separately obtained history, performing a medically appropriate examination and/or evaluation, counseling and educating the patient/family/caregiver and independently interpreting results and communicating that information with the patient/family/caregiver with greater than 50% spent in counseling and coordination of care.     Estephania  KRISTIN Hensley  4/2/2025  Maternal Fetal Medicine-[facility]  Office: [phone]  [email or web address]

## 2025-04-02 NOTE — LETTER
2025     Gerhard Cagle MD  [address]  [address]  [place] [id number]    Patient: Danika Owusu   YOB: 2005   Date of Visit: 2025       Dear Gerhard Cagle MD    Danika Owusu was in my office today. Below is a copy of my note.    If you have questions, please do not hesitate to call me. I look forward to following Danika along with you.         Sincerely,        KRISTIN Dior        CC: No Recipients    Pt reports that she is doing well and denies vaginal bleeding, or LOF at this time. Reports feeling some ctx that started last night and went away after a couple hours. Pt states she is feeling them again, reporting cramping/ctx pain every 15 min. Reports active fetal movement. Reviewed when to call OB office or present to L&D for evaluation with symptoms such as decreased fetal movement, vaginal bleeding, LOF or ctxs. Pt verbalized understanding. Denies HA, visual changes or epigastric pain. Pt also reporting increased itchiness x3 days on arms, hands, and legs. Denies any additional complaints at time of appointment. Next OB appointment scheduled for 4/3.     Vitals:    25 1201   BP: 120/78   Pulse: 105   Temp: 96.9 °F (36.1 °C)   SpO2: 99%         MATERNAL FETAL MEDICINE CONSULT NOTE    Dear Dr Gerhard Cagle MD:    Thank you for your kind referral of Danika Owusu.  As you know, she is a 20 y.o.   35w6d gestation (Estimated Date of Delivery: 25). This is a consult.     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 Reports normal fetal movement.   She denies headache, vision changes, shortness of breath, acute changes in edema, and RUQ pain.     REVIEW OF HISTORY  Past Medical History:   Diagnosis  Date   • Ovarian cyst      No past surgical history on file.    OB History    Para Term  AB Living   3 1 1 0 1 0   SAB IAB Ectopic Molar Multiple Live Births   1 0 0 0 0 1      # Outcome Date GA Lbr Bennie/2nd Weight Sex Type Anes PTL Lv   3 Current            2 Term 10/21/23 38w1d 02:35 / 00:40 3172 g (6 lb 15.9 oz) F Vag-Spont EPI  ND      Birth Comments: scale 4      Name: Tiffany Dan      Apgar1: 8  Apgar5: 9   1 SAB 2022              Obstetric Comments   Fetus with VSD     Social History     Socioeconomic History   • Marital status: Single     Spouse name: Vincenzo Dan   • Number of children: 0   • Years of education: 12   • Highest education level: High school graduate   Tobacco Use   • Smoking status: Never     Passive exposure: Never   • Smokeless tobacco: Never   Vaping Use   • Vaping status: Every Day   • Substances: Nicotine, Flavoring   • Devices: Disposable   • Passive vaping exposure: Yes   Substance and Sexual Activity   • Alcohol use: Never   • Drug use: Not Currently     Frequency: 4.0 times per week     Types: Marijuana   • Sexual activity: Yes     Partners: Male     Birth control/protection: None     Family History   Problem Relation Age of Onset   • Asthma Father    • Asthma Brother    • Hypertension Maternal Grandmother    • Hypertension Maternal Grandfather    • Hypertension Paternal Grandmother    • Hypertension Paternal Grandfather    • Birth defects Neg Hx    • Diabetes Neg Hx       Allergies   Allergen Reactions   • Shellfish Allergy Hives     Hives around throat area     • Fd&C Red #40 [Red Dye #40 (Allura Red)] Diarrhea     other      Current Outpatient Medications on File Prior to Visit   Medication Sig Dispense Refill   • prenatal vitamin (prenatal, CLASSIC, vitamin) tablet Take 1 tablet by mouth Daily.       No current facility-administered medications on file prior to visit.        Common labs          2024    19:36 10/15/2024    11:06 2025     "10:51   Common Labs   Glucose 95      BUN 6      Creatinine 0.65      Sodium 137      Potassium 3.8      Chloride 104      Calcium 9.3      Albumin 4.7      Total Bilirubin 0.2      Alkaline Phosphatase 81      AST (SGOT) 14      ALT (SGPT) 12      WBC 12.91  14.9     Hemoglobin 14.2  13.8  12.0    Hematocrit 44.3  42.0  36.6    Platelets 488  488       Past obstetric, gynecological, medical, surgical, family and social history reviewed.  Relevant lab work and imaging reviewed.    REVIEW OF SYSTEMS  Constitutional:  denies fever, chills, malaise.   ENT/Mouth:  denies sore throat, tinnitus  Eyes: denies vision changes/pain  CV:  denies chest pain  Respiratory:  denies cough/SOB  GI:  denies N/V, diarrhea, abdominal pain.    :   denies dysuria  Skin:  denies lesions or pruritus   Neuro:  denies weakness, focal neurologic symptoms    Vitals:    04/02/25 1201   BP: 120/78   BP Location: Right arm   Patient Position: Sitting   Pulse: 105   Temp: 96.9 °F (36.1 °C)   TempSrc: Temporal   SpO2: 99%   Weight: 78.4 kg (172 lb 12.8 oz)   Height: 160 cm (63\")       Wt Readings from Last 3 Encounters:   04/02/25 78.4 kg (172 lb 12.8 oz)   03/20/25 75.8 kg (167 lb 3.2 oz)   03/06/25 73.4 kg (161 lb 12.8 oz)     BP Readings from Last 3 Encounters:   04/02/25 120/78   03/20/25 115/84   03/15/25 121/75     Pulse Readings from Last 3 Encounters:   04/02/25 105   03/15/25 105   03/05/25 90     Pregnancy Episode    PHYSICAL EXAM   General: No acute distress, pleasant, AAO x 3  Respiratory: No increased work of breathing, speaking in complete sentences without difficulty, symmetric chest rise  Cardiac: Regular rate   abdominal: Gravid, nontender  Extremities: No significant edema  Neuro/psych: Awake, Alert and oriented, appropriate mood/affect    ULTRASOUND   A full ultrasound report can be found under imaging tab  Cephalic presentation  Posterior right placenta  Fetal biometry is consistent with dates EFW 57%, AC 87%  MATTHEW 9.8 cm which " is normal  BPP 6/8 (-2 breathing)    NST  DATE: April 2, 2025  PROCEDURE: nonstress test  INTERPRETING PROVIDER: KRISTIN Womack  GESTATIONAL AGE: 35w6d gestation  DIAGNOSIS: Family H/O CHD and SIDS  INDICATION: BPP 6/8 (-2 for breathing)   START TIME: 13:06  END TIME: 13:29  FINDINGS: NST: 125 bpm, reactive, moderate variability (amplitude 6-25 bpm), present,  no decels noted .   TOCOMETRY: quiet  *Patient was monitored for a minimum of 20 minutes.      ASSESSMENT AND PLAN  Diagnoses and all orders for this visit:    1. Family history of SIDS (sudden infant death syndrome) (Primary)    2. Family history of congenital heart defect         FAMILY HISTORY OF PRIOR CHILD WITH VSD  Previously Counseled.   Prior baby unfortunately had SIDS at 2 months of life and passed.  She would like serial growths at [facility] for peace of mind.  I explained that VSDs are the most common single congenital cardiac malformation.  About 3 per cent of CHD is due to classical Mendelian gene effects, with correspondingly high recurrence risks in first-degree relatives, but most cardiac disease inheritance is lower than what would be predicted by Mendelian genetics.  The recurrence risks for offspring are about 10% in most instances of VSD.    Thus, a fetal ECHO is indicated.  I discussed with the patient the limitations of fetal ECHO for small VSDs. However, these often close on their own and do not cause issues. She has been scheduled and missed her echo twice. Will reschedule.     Pt had fetal echo scheduled x 3 and no showed them all so she has not had a fetal echo.   Pt reports they are moving to [place] but she will inform the pediatrician of family h/o CHD. Pt aware they can always refer to peds cardiology if any concern after baby is born since she did not get a fetal echo done.       Summary of Plan  -Fetal ECHO -- no show x3   -Continue routine prenatal care with primary ob team   -Serial growth ultrasounds every 4  weeks  ([facility])   -Pt desires serial growths at [facility] for significant anxiety related to her previous  loss  -Fetal movement instructions given continue daily until delivery; instructed to report to labor and delivery if cannot achieve more than 10 kicks in one hour or if she perceives a decrease in fetal movement  -At this time, delivery is recommended at 39 weeks, unless indicated sooner for maternal or fetal reasons    Follow-up: No follow up with M scheduled, but I am happy to see for follow up at request of primary obstetrician  Congratulations and Best Wishes!!!    Thank you for the consult and opportunity to care for this patient.  Please feel free to reach out with any questions or concerns.      I spent 30 minutes caring for this patient on this date of service. This time includes time spent by me in the following activities: preparing for the visit, reviewing tests, obtaining and/or reviewing a separately obtained history, performing a medically appropriate examination and/or evaluation, counseling and educating the patient/family/caregiver and independently interpreting results and communicating that information with the patient/family/caregiver with greater than 50% spent in counseling and coordination of care.     KRISTIN Womack  2025  Maternal Fetal Medicine-[facility]  Office: [phone]  [email or web address]    Reason for test:  BPP  (-2 breathing)  Date of Test: 2025  Time frame of test: 4227-6953  RN NST Interpretation:  Reactive NST. Fetal heart rate baseline 125bpm. Moderate variability noted, +accelerations and no decelerations. Active fetal movement noted throughout monitoring per pt report. NST reviewed by KRISTIN Womack.

## 2025-04-02 NOTE — PROGRESS NOTES
Reason for test:  BPP 6/8 (-2 breathing)  Date of Test: 4/2/2025  Time frame of test: 9646-8305  RN NST Interpretation:  Reactive NST. Fetal heart rate baseline 125bpm. Moderate variability noted, +accelerations and no decelerations. Active fetal movement noted throughout monitoring per pt report. NST reviewed by KRISTIN Womack.

## 2025-04-03 ENCOUNTER — ROUTINE PRENATAL (OUTPATIENT)
Dept: OBSTETRICS AND GYNECOLOGY | Facility: CLINIC | Age: 20
End: 2025-04-03

## 2025-04-03 VITALS — SYSTOLIC BLOOD PRESSURE: 117 MMHG | WEIGHT: 169 LBS | DIASTOLIC BLOOD PRESSURE: 67 MMHG | BODY MASS INDEX: 29.94 KG/M2

## 2025-04-03 DIAGNOSIS — Z87.59 HISTORY OF NEONATAL DEATH: ICD-10-CM

## 2025-04-03 DIAGNOSIS — Z3A.36 36 WEEKS GESTATION OF PREGNANCY: Primary | ICD-10-CM

## 2025-04-03 DIAGNOSIS — Z34.83 PRENATAL CARE, SUBSEQUENT PREGNANCY IN THIRD TRIMESTER: ICD-10-CM

## 2025-04-03 DIAGNOSIS — Z82.79 FAMILY HISTORY OF CONGENITAL HEART DEFECT: ICD-10-CM

## 2025-04-03 LAB
GLUCOSE UR STRIP-MCNC: NEGATIVE MG/DL
PROT UR STRIP-MCNC: NEGATIVE MG/DL

## 2025-04-03 NOTE — PROGRESS NOTES
OB follow up     Danika Owusu is a 20 y.o.  36w0d being seen today for her obstetrical visit.  Patient reports no complaints. Fetal movement: normal.    Her prenatal care is complicated by (and status): History of  death due to SIDS    Review of Systems  Cramping/contractions : Occasional  Vaginal bleeding: Negative  Fetal movement is normal    /67   Wt 76.7 kg (169 lb)   LMP 2024 (Exact Date)   BMI 29.94 kg/m²     FHT: 134 BPM   Uterine Size: size equals dates       Assessment    Diagnoses and all orders for this visit:    1. 36 weeks gestation of pregnancy (Primary)  -     POC Urinalysis Dipstick    2. Prenatal care, subsequent pregnancy in third trimester    3. Family history of congenital heart defect    4. History of  death        1) pregnancy at 36w0d         Plan    Reviewed this stage of pregnancy  Problem list updated   Follow up in 1 week GBS done today..     Gerhard Cagle MD   4/3/2025  10:51 EDT

## 2025-04-05 LAB — GP B STREP DNA SPEC QL NAA+PROBE: NEGATIVE

## 2025-04-10 ENCOUNTER — ROUTINE PRENATAL (OUTPATIENT)
Dept: OBSTETRICS AND GYNECOLOGY | Facility: CLINIC | Age: 20
End: 2025-04-10
Payer: MEDICAID

## 2025-04-10 VITALS — SYSTOLIC BLOOD PRESSURE: 126 MMHG | BODY MASS INDEX: 30.89 KG/M2 | DIASTOLIC BLOOD PRESSURE: 83 MMHG | WEIGHT: 174.4 LBS

## 2025-04-10 DIAGNOSIS — Z3A.37 37 WEEKS GESTATION OF PREGNANCY: Primary | ICD-10-CM

## 2025-04-10 DIAGNOSIS — Z34.83 PRENATAL CARE, SUBSEQUENT PREGNANCY IN THIRD TRIMESTER: ICD-10-CM

## 2025-04-10 LAB
GLUCOSE UR STRIP-MCNC: NEGATIVE MG/DL
PROT UR STRIP-MCNC: NEGATIVE MG/DL

## 2025-04-10 NOTE — PROGRESS NOTES
OB follow up     Danika Owusu is a 20 y.o.  37w0d being seen today for her obstetrical visit.  Patient reports no complaints. Fetal movement: normal.    Her prenatal care is complicated by (and status): Nothing    Review of Systems  Cramping/contractions : Negative  Vaginal bleeding: Negative  Fetal movement is normal    /83   Wt 79.1 kg (174 lb 6.4 oz)   LMP 2024 (Exact Date)   BMI 30.89 kg/m²     FHT: 138 BPM   Uterine Size: size equals dates       Assessment    Diagnoses and all orders for this visit:    1. 37 weeks gestation of pregnancy (Primary)  -     POC Urinalysis Dipstick    2. Prenatal care, subsequent pregnancy in third trimester        1) pregnancy at 37w0d         Plan    Reviewed this stage of pregnancy  Problem list updated   Follow up in 1 week.  No change in cervical exam today.  Plans for 39-week induction as recommended by maternal-fetal medicine discussed with patient.  Patient states that she is moving to the [facility] Tuesday, 4/15/2025.  The [facility] has been notified and they have excepted her for transfer of care but the patient has not called to actually schedule an appointment there.  I will have a member of my staff call over to set up an appointment and reach out to the patient with that information and I urged the patient herself to call.    Gerhard Cagle MD   4/10/2025  11:16 EDT

## 2025-04-10 NOTE — Clinical Note
Could you please call or message the [facility] group, Dr. Campos, to schedule her an appointment next Wednesday, Thursday or Friday. They received a referral request and have excepted to see her but the patient has still not called to make an appointment.  She is moving to the [place] area early next week.  Thank you